# Patient Record
Sex: MALE | Race: BLACK OR AFRICAN AMERICAN | NOT HISPANIC OR LATINO | Employment: FULL TIME | URBAN - METROPOLITAN AREA
[De-identification: names, ages, dates, MRNs, and addresses within clinical notes are randomized per-mention and may not be internally consistent; named-entity substitution may affect disease eponyms.]

---

## 2017-08-24 ENCOUNTER — OFFICE VISIT (OUTPATIENT)
Dept: RETAIL CLINIC | Facility: CLINIC | Age: 42
End: 2017-08-24

## 2017-08-24 DIAGNOSIS — Z02.1 PRE-EMPLOYMENT DRUG SCREENING: Primary | ICD-10-CM

## 2022-05-03 ENCOUNTER — HOSPITAL ENCOUNTER (EMERGENCY)
Facility: HOSPITAL | Age: 47
Discharge: HOME | End: 2022-05-03
Attending: EMERGENCY MEDICINE | Admitting: EMERGENCY MEDICINE

## 2022-05-03 ENCOUNTER — APPOINTMENT (OUTPATIENT)
Dept: GENERAL RADIOLOGY | Facility: HOSPITAL | Age: 47
End: 2022-05-03

## 2022-05-03 VITALS
WEIGHT: 239 LBS | RESPIRATION RATE: 17 BRPM | HEIGHT: 70 IN | DIASTOLIC BLOOD PRESSURE: 113 MMHG | OXYGEN SATURATION: 96 % | TEMPERATURE: 98.6 F | SYSTOLIC BLOOD PRESSURE: 149 MMHG | HEART RATE: 67 BPM | BODY MASS INDEX: 34.22 KG/M2

## 2022-05-03 DIAGNOSIS — M10.472 ACUTE GOUT DUE TO OTHER SECONDARY CAUSE INVOLVING TOE OF LEFT FOOT: Primary | ICD-10-CM

## 2022-05-03 PROCEDURE — 73660 X-RAY EXAM OF TOE(S): CPT

## 2022-05-03 PROCEDURE — 25010000002 HYDROMORPHONE PER 4 MG: Performed by: EMERGENCY MEDICINE

## 2022-05-03 PROCEDURE — 96372 THER/PROPH/DIAG INJ SC/IM: CPT

## 2022-05-03 PROCEDURE — 99283 EMERGENCY DEPT VISIT LOW MDM: CPT

## 2022-05-03 RX ORDER — HYDROCHLOROTHIAZIDE 12.5 MG/1
12.5 CAPSULE, GELATIN COATED ORAL DAILY
COMMUNITY

## 2022-05-03 RX ORDER — IBUPROFEN 800 MG/1
800 TABLET ORAL EVERY 8 HOURS PRN
Qty: 12 TABLET | Refills: 0 | Status: SHIPPED | OUTPATIENT
Start: 2022-05-03 | End: 2022-05-07

## 2022-05-03 RX ORDER — METOPROLOL SUCCINATE 100 MG/1
100 TABLET, EXTENDED RELEASE ORAL DAILY
COMMUNITY

## 2022-05-03 RX ORDER — HYDROMORPHONE HYDROCHLORIDE 1 MG/ML
0.5 INJECTION, SOLUTION INTRAMUSCULAR; INTRAVENOUS; SUBCUTANEOUS ONCE
Status: COMPLETED | OUTPATIENT
Start: 2022-05-03 | End: 2022-05-03

## 2022-05-03 RX ORDER — PREDNISONE 50 MG/1
50 TABLET ORAL DAILY
Qty: 5 TABLET | Refills: 0 | Status: SHIPPED | OUTPATIENT
Start: 2022-05-03

## 2022-05-03 RX ORDER — HYDROCODONE BITARTRATE AND ACETAMINOPHEN 5; 325 MG/1; MG/1
1 TABLET ORAL EVERY 6 HOURS PRN
Qty: 7 TABLET | Refills: 0 | Status: SHIPPED | OUTPATIENT
Start: 2022-05-03

## 2022-05-03 RX ORDER — AMLODIPINE BESYLATE AND BENAZEPRIL HYDROCHLORIDE 10; 20 MG/1; MG/1
CAPSULE ORAL DAILY
COMMUNITY

## 2022-05-03 RX ADMIN — HYDROMORPHONE HYDROCHLORIDE 0.5 MG: 1 INJECTION, SOLUTION INTRAMUSCULAR; INTRAVENOUS; SUBCUTANEOUS at 06:39

## 2022-05-03 NOTE — ED PROVIDER NOTES
Subjective   This patient is a very pleasant 46-year-old -American male who comes in with complaint of gout.  He tells me he waited at a nearby hospital for approximately 7 or 8 hours in the lobby and decided to come here after calling and finding out that there was no wait time in the lobby here.  He tells me he has a history of gout.  Tells me that over the last couple of days, his left great toe started to flareup.  He tells me he has had gout in this toe before.  He tells me it is red, swollen and tender.  He denies any nausea vomiting or diarrhea.  Denies any fever chills or cough.  Denies any headache or vision changes.  Denies any fevers or chills.  He tells me he has had gout flareups in the past.  His younger brother has gout as well.  No other family member has gout.  In the past, he has taken allopurinol and tells me he went several years without any flareups.  Evidently, colchicine did not work for him and actually made his symptoms worse.  He is unsure of whether or not he has used steroids or NSAIDs in the past.  Patient comes in for pain relief and is pleasant, articulate, oriented x4.    Past medical history  Hypertension, controlled with medication, gout.  Patient denies any history of CAD, CVA, or hyperlipidemia.    Family history  Gout, brother          Review of Systems   Constitutional: Negative.  Negative for chills, fatigue, fever and unexpected weight change.   HENT: Negative for dental problem, ear pain, hearing loss and sinus pressure.    Eyes: Negative for pain and visual disturbance.   Respiratory: Negative for chest tightness and shortness of breath.    Cardiovascular: Negative for chest pain, palpitations and leg swelling.   Gastrointestinal: Negative for blood in stool, diarrhea, nausea and vomiting.   Genitourinary: Negative for difficulty urinating, dysuria, frequency, hematuria and urgency.   Musculoskeletal: Positive for arthralgias and joint swelling. Negative for myalgias,  neck pain and neck stiffness.        Left great toe   Neurological: Negative for seizures, syncope, speech difficulty, light-headedness and headaches.   Psychiatric/Behavioral: Negative for confusion.   All other systems reviewed and are negative.      Past Medical History:   Diagnosis Date   • Acute gout involving toe of left foot        Allergies   Allergen Reactions   • Lisinopril Anaphylaxis       History reviewed. No pertinent surgical history.    History reviewed. No pertinent family history.    Social History     Socioeconomic History   • Marital status:    Tobacco Use   • Smoking status: Current Every Day Smoker   Substance and Sexual Activity   • Drug use: Never   • Sexual activity: Not Currently           Objective   Physical Exam  Constitutional:       Appearance: Normal appearance. He is normal weight.   HENT:      Head: Normocephalic and atraumatic.      Right Ear: External ear normal.      Left Ear: External ear normal.      Nose: Nose normal.      Mouth/Throat:      Mouth: Mucous membranes are moist.      Pharynx: Oropharynx is clear.   Eyes:      General:         Right eye: No discharge.         Left eye: No discharge.      Extraocular Movements: Extraocular movements intact.      Conjunctiva/sclera: Conjunctivae normal.      Pupils: Pupils are equal, round, and reactive to light.   Cardiovascular:      Rate and Rhythm: Normal rate and regular rhythm.      Pulses: Normal pulses.      Heart sounds: Normal heart sounds. No murmur heard.    No gallop.   Pulmonary:      Effort: Pulmonary effort is normal.      Breath sounds: Normal breath sounds.   Abdominal:      General: Abdomen is flat. Bowel sounds are normal. There is no distension.      Palpations: Abdomen is soft. There is no mass.      Tenderness: There is no abdominal tenderness. There is no guarding.      Hernia: No hernia is present.   Musculoskeletal:         General: Swelling and tenderness present. Normal range of motion.       Cervical back: Normal range of motion.      Comments: Left great toe is warm, red, and with decreased mobility with flexion and extension secondary to pain.  No streaking erythema, or cellulitis.  No involvement of the foot or ankle joints.   Skin:     General: Skin is warm.      Capillary Refill: Capillary refill takes less than 2 seconds.      Coloration: Skin is not jaundiced.      Findings: Erythema present. No bruising.      Comments: Mild erythema around the left great toe only.  No streaking cellulitis.  No abscess.  No involvement elsewhere other than isolated to the left toe.   Neurological:      General: No focal deficit present.      Mental Status: He is alert and oriented to person, place, and time.         Procedures           ED Course  ED Course as of 05/03/22 1601   Tue May 03, 2022   0636 I had a nice conversation with the patient.  We talked about the differential diagnosis and medical decision making.  The patient has a longstanding history of gout with several flareups.  He presents here with an isolated left great toe monoarticular complaint with warmth, erythema and mild joint swelling within the left great toe.  He has no systemic symptoms.  No fever, nausea, vomiting, or subjective feeling of illness.  My plan will be to get his pain under control and I have ordered Dilaudid IM.  His wife is going to drive him home.  I will get an x-ray more or less for baseline evaluation of the toe.  I plan to send the patient home on steroids and NSAIDs and have talked to the patient both of these.  I have also talked about colchicine and allopurinol and have encouraged patient to follow-up with his PCP, Dr. De León, who he sees for blood pressure control and outpatient gout management.  Patient is very appreciative for care and without question or complaint.  Patient has no history of renal function abnormality, hepatotoxicity or gastropathy.  Patient has no history of liver disease or myopathy.  Final  impression and plan following completion of work-up. [HALEY]   0642 Patient has had issues with colchicine in the past and tells me this actually made his symptoms worse. [HALEY]   0645 I reexamined the patient after Dilaudid has been given.  He is feeling much better.  He verbalizes a full understanding of the plan.  We talked about the medications he has been prescribed as an outpatient as well.  He is currently getting his x-ray.  I will review this prior to discharge. [HALEY]   0736 X-ray has been completed, and I have reviewed images independently.  I have also cut/pasted official read below for completeness.    IMPRESSION:  1.  Arthritic changes are noted involving the first digit.     This report was finalized on 5/3/2022 7:08 AM by Joaquin Ulloa MD. [HALEY]   0736 Patient is resting comfortably currently [HALEY]   0736 .  Blood pressure has come down to 149 systolically.  Heart rate 64 with oxygen saturations of 98% on room air.  Patient will be discharged home with impression that includes acute gout.  Plan to include high-dose Motrin, steroids, follow-up with his PCP, return immediately for new or changing concerns.  Patient is agreeable to the plan without question or complaint and will be discharged home accordingly [HALEY]      ED Course User Index  [HALEY] Charles Olea MD     No results found for this or any previous visit (from the past 24 hour(s)).  Note: In addition to lab results from this visit, the labs listed above may include labs taken at another facility or during a different encounter within the last 24 hours. Please correlate lab times with ED admission and discharge times for further clarification of the services performed during this visit.    XR Toe 2+ View Left   Final Result   1.  Arthritic changes are noted involving the first digit.       This report was finalized on 5/3/2022 7:08 AM by Joaquin Ulloa MD.            Vitals:    05/03/22 0615 05/03/22 0616 05/03/22 0645 05/03/22 0730   BP: (!) 150/104   (!) 161/115 (!) 149/113   BP Location:   Right arm Right arm   Patient Position:   Lying Lying   Pulse:   69 67   Resp:   17 17   Temp:  98.6 °F (37 °C)     TempSrc:  Oral     SpO2:  98% 98% 96%   Weight:       Height:         Medications   HYDROmorphone (DILAUDID) injection 0.5 mg (0.5 mg Intramuscular Given 5/3/22 0639)     ECG/EMG Results (last 24 hours)     ** No results found for the last 24 hours. **        No orders to display                                       [place] reviewed by Charles Olea MD             [facility]    Final diagnoses:   Acute gout due to other secondary cause involving toe of left foot       ED Disposition  ED Disposition     ED Disposition   Discharge    Condition   Stable    Comment   --             Leoncio De León MD  [address] [facility]   [place] [id number] [phone]    In 1 day           Medication List      New Prescriptions    HYDROcodone-acetaminophen 5-325 MG per tablet  Commonly known as: NORCO  Take 1 tablet by mouth Every 6 (Six) Hours As Needed for Moderate Pain .     ibuprofen 800 MG tablet  Commonly known as: ADVIL,MOTRIN  Take 1 tablet by mouth Every 8 (Eight) Hours As Needed for Mild Pain  or Moderate Pain  (With food) for up to 4 days.     predniSONE 50 MG tablet  Commonly known as: DELTASONE  Take 1 tablet by mouth Daily.           Where to Get Your Medications      These medications were sent to [facility] [address] [place] [address] - [address] [facility] - [phone]  - [phone]   [address] [place] [id number]    Phone: [phone]   · HYDROcodone-acetaminophen 5-325 MG per tablet  · ibuprofen 800 MG tablet  · predniSONE 50 MG tablet          Charles Olea MD  05/03/22 4702

## 2022-05-03 NOTE — DISCHARGE INSTRUCTIONS
Take medications as prescribed.    Return to the emergency department immediately for new or changing concerns including worsening pain, redness, decreased mobility, fever or new concerns.    Follow-up with your PCP today or tomorrow as we discussed in detail.  Discussed colchicine and allopurinol therapy.    Please review the medications you are supposed to be taking according to prior physician recommendations. I have not changed your home medications during this visit. If your discharge instructions indicate that I have changed your home medications, this is not the case, and you should disregard. If you have any questions about the medication you should be taking at home, please call your physician.

## 2023-07-31 ENCOUNTER — HOSPITAL ENCOUNTER (OUTPATIENT)
Facility: HOSPITAL | Age: 48
Setting detail: OBSERVATION
Discharge: HOME | End: 2023-08-02
Attending: EMERGENCY MEDICINE | Admitting: INTERNAL MEDICINE
Payer: COMMERCIAL

## 2023-07-31 ENCOUNTER — APPOINTMENT (OUTPATIENT)
Dept: CT IMAGING | Facility: HOSPITAL | Age: 48
End: 2023-07-31
Payer: COMMERCIAL

## 2023-07-31 DIAGNOSIS — Z86.79 HISTORY OF HYPERTENSION: ICD-10-CM

## 2023-07-31 DIAGNOSIS — J35.1 PALATINE TONSIL HYPERTROPHY: ICD-10-CM

## 2023-07-31 DIAGNOSIS — R59.0 CERVICAL LYMPHADENOPATHY: ICD-10-CM

## 2023-07-31 DIAGNOSIS — K11.21 ACUTE BACTERIAL SIALADENITIS: Primary | ICD-10-CM

## 2023-07-31 DIAGNOSIS — L04.9 ACUTE ADENITIS: ICD-10-CM

## 2023-07-31 DIAGNOSIS — Z72.0 TOBACCO ABUSE: ICD-10-CM

## 2023-07-31 DIAGNOSIS — B96.89 ACUTE BACTERIAL SIALADENITIS: Primary | ICD-10-CM

## 2023-07-31 LAB
ALBUMIN SERPL-MCNC: 5 G/DL (ref 3.5–5.2)
ALBUMIN/GLOB SERPL: 1.6 G/DL
ALP SERPL-CCNC: 43 U/L (ref 39–117)
ALT SERPL W P-5'-P-CCNC: 21 U/L (ref 1–41)
ANION GAP SERPL CALCULATED.3IONS-SCNC: 13 MMOL/L (ref 5–15)
AST SERPL-CCNC: 25 U/L (ref 1–40)
B PARAPERT DNA SPEC QL NAA+PROBE: NOT DETECTED
B PERT DNA SPEC QL NAA+PROBE: NOT DETECTED
BASOPHILS # BLD AUTO: 0.02 10*3/MM3 (ref 0–0.2)
BASOPHILS NFR BLD AUTO: 0.3 % (ref 0–1.5)
BILIRUB SERPL-MCNC: 0.6 MG/DL (ref 0–1.2)
BUN SERPL-MCNC: 18 MG/DL (ref 6–20)
BUN/CREAT SERPL: 14.9 (ref 7–25)
C PNEUM DNA NPH QL NAA+NON-PROBE: NOT DETECTED
CALCIUM SPEC-SCNC: 9.4 MG/DL (ref 8.6–10.5)
CHLORIDE SERPL-SCNC: 98 MMOL/L (ref 98–107)
CO2 SERPL-SCNC: 27 MMOL/L (ref 22–29)
CREAT SERPL-MCNC: 1.21 MG/DL (ref 0.76–1.27)
DEPRECATED RDW RBC AUTO: 39.7 FL (ref 37–54)
EGFRCR SERPLBLD CKD-EPI 2021: 74.8 ML/MIN/1.73
EOSINOPHIL # BLD AUTO: 0.08 10*3/MM3 (ref 0–0.4)
EOSINOPHIL NFR BLD AUTO: 1.2 % (ref 0.3–6.2)
ERYTHROCYTE [DISTWIDTH] IN BLOOD BY AUTOMATED COUNT: 12.7 % (ref 12.3–15.4)
FLUAV SUBTYP SPEC NAA+PROBE: NOT DETECTED
FLUBV RNA ISLT QL NAA+PROBE: NOT DETECTED
GLOBULIN UR ELPH-MCNC: 3.1 GM/DL
GLUCOSE SERPL-MCNC: 129 MG/DL (ref 65–99)
HADV DNA SPEC NAA+PROBE: NOT DETECTED
HCOV 229E RNA SPEC QL NAA+PROBE: NOT DETECTED
HCOV HKU1 RNA SPEC QL NAA+PROBE: NOT DETECTED
HCOV NL63 RNA SPEC QL NAA+PROBE: NOT DETECTED
HCOV OC43 RNA SPEC QL NAA+PROBE: NOT DETECTED
HCT VFR BLD AUTO: 44.6 % (ref 37.5–51)
HGB BLD-MCNC: 15.2 G/DL (ref 13–17.7)
HMPV RNA NPH QL NAA+NON-PROBE: NOT DETECTED
HPIV1 RNA ISLT QL NAA+PROBE: NOT DETECTED
HPIV2 RNA SPEC QL NAA+PROBE: NOT DETECTED
HPIV3 RNA NPH QL NAA+PROBE: NOT DETECTED
HPIV4 P GENE NPH QL NAA+PROBE: NOT DETECTED
IMM GRANULOCYTES # BLD AUTO: 0.01 10*3/MM3 (ref 0–0.05)
IMM GRANULOCYTES NFR BLD AUTO: 0.2 % (ref 0–0.5)
LYMPHOCYTES # BLD AUTO: 2.97 10*3/MM3 (ref 0.7–3.1)
LYMPHOCYTES NFR BLD AUTO: 46.3 % (ref 19.6–45.3)
M PNEUMO IGG SER IA-ACNC: NOT DETECTED
MCH RBC QN AUTO: 29.2 PG (ref 26.6–33)
MCHC RBC AUTO-ENTMCNC: 34.1 G/DL (ref 31.5–35.7)
MCV RBC AUTO: 85.8 FL (ref 79–97)
MONOCYTES # BLD AUTO: 0.56 10*3/MM3 (ref 0.1–0.9)
MONOCYTES NFR BLD AUTO: 8.7 % (ref 5–12)
NEUTROPHILS NFR BLD AUTO: 2.78 10*3/MM3 (ref 1.7–7)
NEUTROPHILS NFR BLD AUTO: 43.3 % (ref 42.7–76)
NRBC BLD AUTO-RTO: 0 /100 WBC (ref 0–0.2)
PLATELET # BLD AUTO: 219 10*3/MM3 (ref 140–450)
PMV BLD AUTO: 11.2 FL (ref 6–12)
POTASSIUM SERPL-SCNC: 3.8 MMOL/L (ref 3.5–5.2)
PROT SERPL-MCNC: 8.1 G/DL (ref 6–8.5)
RBC # BLD AUTO: 5.2 10*6/MM3 (ref 4.14–5.8)
RHINOVIRUS RNA SPEC NAA+PROBE: NOT DETECTED
RSV RNA NPH QL NAA+NON-PROBE: NOT DETECTED
S PYO AG THROAT QL: NEGATIVE
SARS-COV-2 RNA NPH QL NAA+NON-PROBE: NOT DETECTED
SODIUM SERPL-SCNC: 138 MMOL/L (ref 136–145)
WBC NRBC COR # BLD: 6.42 10*3/MM3 (ref 3.4–10.8)

## 2023-07-31 PROCEDURE — 87081 CULTURE SCREEN ONLY: CPT | Performed by: PHYSICIAN ASSISTANT

## 2023-07-31 PROCEDURE — 85025 COMPLETE CBC W/AUTO DIFF WBC: CPT | Performed by: PHYSICIAN ASSISTANT

## 2023-07-31 PROCEDURE — 99285 EMERGENCY DEPT VISIT HI MDM: CPT

## 2023-07-31 PROCEDURE — 87880 STREP A ASSAY W/OPTIC: CPT | Performed by: PHYSICIAN ASSISTANT

## 2023-07-31 PROCEDURE — 25010000002 DEXAMETHASONE SODIUM PHOSPHATE 100 MG/10ML SOLUTION: Performed by: PHYSICIAN ASSISTANT

## 2023-07-31 PROCEDURE — 70491 CT SOFT TISSUE NECK W/DYE: CPT

## 2023-07-31 PROCEDURE — 25510000001 IOPAMIDOL 61 % SOLUTION: Performed by: EMERGENCY MEDICINE

## 2023-07-31 PROCEDURE — 96365 THER/PROPH/DIAG IV INF INIT: CPT

## 2023-07-31 PROCEDURE — 80053 COMPREHEN METABOLIC PANEL: CPT | Performed by: PHYSICIAN ASSISTANT

## 2023-07-31 PROCEDURE — 87147 CULTURE TYPE IMMUNOLOGIC: CPT | Performed by: PHYSICIAN ASSISTANT

## 2023-07-31 PROCEDURE — 0202U NFCT DS 22 TRGT SARS-COV-2: CPT | Performed by: PHYSICIAN ASSISTANT

## 2023-07-31 RX ORDER — CLINDAMYCIN PHOSPHATE 600 MG/50ML
600 INJECTION INTRAVENOUS ONCE
Status: COMPLETED | OUTPATIENT
Start: 2023-07-31 | End: 2023-08-01

## 2023-07-31 RX ORDER — SODIUM CHLORIDE 0.9 % (FLUSH) 0.9 %
10 SYRINGE (ML) INJECTION AS NEEDED
Status: DISCONTINUED | OUTPATIENT
Start: 2023-07-31 | End: 2023-08-02 | Stop reason: HOSPADM

## 2023-07-31 RX ORDER — CLINDAMYCIN PHOSPHATE 300 MG/50ML
300 INJECTION INTRAVENOUS ONCE
Status: COMPLETED | OUTPATIENT
Start: 2023-07-31 | End: 2023-08-01

## 2023-07-31 RX ADMIN — IOPAMIDOL 75 ML: 612 INJECTION, SOLUTION INTRAVENOUS at 21:40

## 2023-07-31 RX ADMIN — DEXAMETHASONE SODIUM PHOSPHATE 10 MG: 10 INJECTION, SOLUTION INTRAMUSCULAR; INTRAVENOUS at 23:58

## 2023-07-31 NOTE — Clinical Note
Level of Care: Telemetry [5]   Diagnosis: Sialadenitis [205217]   Admitting Physician: FRANCES DICKENS III [[id number]]   Attending Physician: FRANCES DICKENS III [[id number]]   Bed Request Comments: tele obs

## 2023-07-31 NOTE — LETTER
To Whom it May Concern:      Mr. Sanchez was under the care of [facility] from Monday 7/31/2023 through Wednesday 8/2/2023.     Thank you         Martina Melendez Rn

## 2023-07-31 NOTE — ED PROVIDER NOTES
EMERGENCY DEPARTMENT ENCOUNTER    Pt Name: Bandar Sanchez  MRN: [id number]  Pt :   1975  Room Number:  S226/1  Date of encounter:  2023  PCP: Leoncio De León MD  ED Provider: DAYA Sims    Historian: Patient    HPI:  Chief Complaint: Neck pain and swelling    Context: Bandar Sanchez is a 47 y.o. male who presents to the ED c/o pain and swelling of the right side of his neck.  Patient states that he started to experience symptoms last night.  He states that when he laid down to go to sleep he felt as if the swelling was pressing on his airway causing him to have more difficulty with breathing.  Patient sleeps with a CPAP at night.  He has past medical history significant for hypertension.  He noticed that the swelling has gotten worse through the day today and that he is having difficulty swallowing.  He current denies any difficulty with breathing.  He reports no fever.  He denies any recent symptoms of cough, runny nose or nasal congestion.  He reports no additional associated symptoms on exam.  HPI     REVIEW OF SYSTEMS  A chief complaint appropriate review of systems was completed and is negative except as noted in the HPI.     PAST MEDICAL HISTORY  Past Medical History:   Diagnosis Date    Acute gout involving toe of left foot        PAST SURGICAL HISTORY  History reviewed. No pertinent surgical history.    FAMILY HISTORY  History reviewed. No pertinent family history.    SOCIAL HISTORY  Social History     Socioeconomic History    Marital status:    Tobacco Use    Smoking status: Some Days     Types: Cigars   Vaping Use    Vaping Use: Some days    Substances: Nicotine    Devices: Disposable   Substance and Sexual Activity    Drug use: Never    Sexual activity: Defer       ALLERGIES  Lisinopril    PHYSICAL EXAM  Physical Exam  Vitals and nursing note reviewed.   Constitutional:       General: He is not in acute distress.     Appearance: Normal appearance. He is ill-appearing.    HENT:      Head: Normocephalic and atraumatic.      Jaw: Tenderness present.      Salivary Glands: Right salivary gland is diffusely enlarged and tender.        Nose: Nose normal.      Mouth/Throat:      Mouth: Mucous membranes are moist.      Tonsils: No tonsillar exudate or tonsillar abscesses. 3+ on the right. 3+ on the left.   Eyes:      Extraocular Movements: Extraocular movements intact.   Cardiovascular:      Rate and Rhythm: Normal rate.   Pulmonary:      Effort: Pulmonary effort is normal.   Abdominal:      General: There is no distension.   Musculoskeletal:         General: Normal range of motion.      Cervical back: Normal range of motion and neck supple.   Skin:     General: Skin is warm and dry.   Neurological:      General: No focal deficit present.      Mental Status: He is alert.   Psychiatric:         Mood and Affect: Mood normal.         Behavior: Behavior normal.       LAB RESULTS  Results for orders placed or performed during the hospital encounter of 07/31/23   Rapid Strep A Screen - Swab, Throat    Specimen: Throat; Swab   Result Value Ref Range    Strep A Ag Negative Negative   Respiratory Panel PCR w/COVID-19(SARS-CoV-2) JENNIFER/CARLOS/CARLEE/PAD/COR/MAD/NADJA In-House, NP Swab in UTM/VTM, 3-4 HR TAT - Swab, Nasopharynx    Specimen: Nasopharynx; Swab   Result Value Ref Range    ADENOVIRUS, PCR Not Detected Not Detected    Coronavirus 229E Not Detected Not Detected    Coronavirus HKU1 Not Detected Not Detected    Coronavirus NL63 Not Detected Not Detected    Coronavirus OC43 Not Detected Not Detected    COVID19 Not Detected Not Detected - Ref. Range    Human Metapneumovirus Not Detected Not Detected    Human Rhinovirus/Enterovirus Not Detected Not Detected    Influenza A PCR Not Detected Not Detected    Influenza B PCR Not Detected Not Detected    Parainfluenza Virus 1 Not Detected Not Detected    Parainfluenza Virus 2 Not Detected Not Detected    Parainfluenza Virus 3 Not Detected Not Detected     Parainfluenza Virus 4 Not Detected Not Detected    RSV, PCR Not Detected Not Detected    Bordetella pertussis pcr Not Detected Not Detected    Bordetella parapertussis PCR Not Detected Not Detected    Chlamydophila pneumoniae PCR Not Detected Not Detected    Mycoplasma pneumo by PCR Not Detected Not Detected   Comprehensive Metabolic Panel    Specimen: Blood   Result Value Ref Range    Glucose 129 (H) 65 - 99 mg/dL    BUN 18 6 - 20 mg/dL    Creatinine 1.21 0.76 - 1.27 mg/dL    Sodium 138 136 - 145 mmol/L    Potassium 3.8 3.5 - 5.2 mmol/L    Chloride 98 98 - 107 mmol/L    CO2 27.0 22.0 - 29.0 mmol/L    Calcium 9.4 8.6 - 10.5 mg/dL    Total Protein 8.1 6.0 - 8.5 g/dL    Albumin 5.0 3.5 - 5.2 g/dL    ALT (SGPT) 21 1 - 41 U/L    AST (SGOT) 25 1 - 40 U/L    Alkaline Phosphatase 43 39 - 117 U/L    Total Bilirubin 0.6 0.0 - 1.2 mg/dL    Globulin 3.1 gm/dL    A/G Ratio 1.6 g/dL    BUN/Creatinine Ratio 14.9 7.0 - 25.0    Anion Gap 13.0 5.0 - 15.0 mmol/L    eGFR 74.8 >60.0 mL/min/1.73   CBC Auto Differential    Specimen: Blood   Result Value Ref Range    WBC 6.42 3.40 - 10.80 10*3/mm3    RBC 5.20 4.14 - 5.80 10*6/mm3    Hemoglobin 15.2 13.0 - 17.7 g/dL    Hematocrit 44.6 37.5 - 51.0 %    MCV 85.8 79.0 - 97.0 fL    MCH 29.2 26.6 - 33.0 pg    MCHC 34.1 31.5 - 35.7 g/dL    RDW 12.7 12.3 - 15.4 %    RDW-SD 39.7 37.0 - 54.0 fl    MPV 11.2 6.0 - 12.0 fL    Platelets 219 140 - 450 10*3/mm3    Neutrophil % 43.3 42.7 - 76.0 %    Lymphocyte % 46.3 (H) 19.6 - 45.3 %    Monocyte % 8.7 5.0 - 12.0 %    Eosinophil % 1.2 0.3 - 6.2 %    Basophil % 0.3 0.0 - 1.5 %    Immature Grans % 0.2 0.0 - 0.5 %    Neutrophils, Absolute 2.78 1.70 - 7.00 10*3/mm3    Lymphocytes, Absolute 2.97 0.70 - 3.10 10*3/mm3    Monocytes, Absolute 0.56 0.10 - 0.90 10*3/mm3    Eosinophils, Absolute 0.08 0.00 - 0.40 10*3/mm3    Basophils, Absolute 0.02 0.00 - 0.20 10*3/mm3    Immature Grans, Absolute 0.01 0.00 - 0.05 10*3/mm3    nRBC 0.0 0.0 - 0.2 /100 WBC       If labs  were ordered, I independently reviewed the results and considered them in treating the patient.    RADIOLOGY  CT Soft Tissue Neck With Contrast   Final Result   Impression:   1.Edematous and enlarged right submandibular gland with surrounding fat stranding. This finding suggest acute sialadenitis.   2.Severe palatine tonsillitis/adenitis causing severe airway narrowing.   3.Extensive soft tissue prominence at the base of tongue may represent enlarged and edematous lingual tonsil/tonsillitis. However, follow-up and direct visualization recommended to exclude base of tongue mass.   4.Neck lymphadenopathy, worse on the right.         Case discussed with clinician ROBBIE HOPE @ 7/31/2023 10:39 PM EDT.         Electronically Signed: Corby Blanco     7/31/2023 10:40 PM EDT     Workstation ID: [id number]        [x] Radiologist's Report Reviewed:  I ordered and independently reviewed the above noted radiographic studies.  See radiologist's dictation for official interpretation.      PROCEDURES    Procedures    No orders to display       MEDICATIONS GIVEN IN ER    Medications   sodium chloride 0.9 % flush 10 mL (has no administration in time range)   sodium chloride 0.9 % infusion (100 mL/hr Intravenous New Bag 8/1/23 0358)   dexamethasone (DECADRON) injection 4 mg (has no administration in time range)   niCARdipine (CARDENE) 25mg in 250mL NS infusion (0 mg/hr Intravenous Hold 8/1/23 0358)   sodium chloride 0.9 % flush 10 mL (has no administration in time range)   sodium chloride 0.9 % flush 10 mL (has no administration in time range)   sodium chloride 0.9 % infusion 40 mL (has no administration in time range)   clindamycin (CLEOCIN) 600 mg in dextrose 5% 50 mL IVPB (premix) (has no administration in time range)   iopamidol (ISOVUE-300) 61 % injection 75 mL (75 mL Intravenous Given 7/31/23 2140)   dexamethasone (DECADRON) IVPB 10 mg (0 mg Intravenous Stopped 8/1/23 0020)   clindamycin (CLEOCIN) IVPB 300 mg (0 mg Intravenous  Stopped 8/1/23 0222)     Followed by   clindamycin (CLEOCIN) 600 mg in dextrose 5% 50 mL IVPB (premix) (0 mg Intravenous Stopped 8/1/23 0306)   ketorolac (TORADOL) injection 30 mg (30 mg Intravenous Given 8/1/23 0308)       MEDICAL DECISION MAKING, PROGRESS, and CONSULTS   Medical Decision Making  Problems Addressed:  Acute adenitis: complicated acute illness or injury  Acute bacterial sialadenitis: complicated acute illness or injury  Cervical lymphadenopathy: complicated acute illness or injury  History of hypertension: complicated acute illness or injury  [place] tonsil hypertrophy: complicated acute illness or injury  Tobacco abuse: complicated acute illness or injury    Amount and/or Complexity of Data Reviewed  Labs: ordered.  Radiology: ordered.    Risk  Prescription drug management.  Decision regarding hospitalization.        All labs have been independently reviewed by me.  All radiology studies have been reviewed by me and the radiologist dictating the report.  All EKG's have been independently viewed by me.    [x] Discussed with radiology regarding test interpretation: Imaging reviewed with radiologist who was concerned based on patient's enlarged tonsils that there is airway compromise.    Discussion below represents my analysis of pertinent findings related to patient's condition, differential diagnosis, treatment plan and final disposition.    Differential diagnosis:  The differential diagnosis associated with the patient's presentation includes: Viral pharyngitis, allergies, strep pharyngitis, infectious mononucleosis, influenza, thrush, uvulitis, smoking, dry air, esophageal reflux, peritonsillar abscess, angioedema, retropharyngeal space infection, epiglottitis and trauma.     Additional sources  Discussed/ obtained information from independent historians:   [x] Spouse  [] Parent  [] Family member  [] Friend  [] EMS   [] Other:  External (non-ED) record review:   [] Inpatient record:   [] Office  record:   [] Outpatient record:   [] Prior Outpatient labs:   [] Prior Outpatient radiology:   [] Primary Care record:   [] Outside ED record:   [] Other:   Patient's care impacted by:   [] Diabetes  [x] Hypertension  [] Hyperlipidemia  [] Hypothyroidism   [] Coronary Artery Disease   [] COPD   [] Cancer   [] Obesity  [] GERD   [] Tobacco Abuse   [] Substance Abuse    [] Anxiety   [] Depression   [x] Other: Obstructive sleep apnea on CPAP  Care significantly affected by Social Determinants of Health (housing and economic circumstances, unemployment)    [] Yes     [x] No   If yes, Patient's care significantly limited by  Social Determinants of Health including:   [] Inadequate housing   [] Low income   [] Alcoholism and drug addiction in family   [] Problems related to primary support group   [] Unemployment   [] Problems related to employment   [] Other Social Determinants of Health:     Shared decision making:  I reviewed workup performed in ED including labs and imaging. Based on findings, recommendation made for admission. Patient is agreeable to plan of care and hospital admission.      Orders placed during this visit:  Orders Placed This Encounter   Procedures    COVID PRE-OP / PRE-PROCEDURE SCREENING ORDER (NO ISOLATION) - Swab, Nasopharynx    Rapid Strep A Screen - Swab, Throat    Respiratory Panel PCR w/COVID-19(SARS-CoV-2) JENNIFER/CARLOS/CARLEE/PAD/COR/MAD/NADJA In-House, NP Swab in UTM/VTM, 3-4 HR TAT - Swab, Nasopharynx    Beta Strep Culture, Throat - Swab, Throat    CT Soft Tissue Neck With Contrast    Comprehensive Metabolic Panel    CBC Auto Differential    CBC Auto Differential    Basic Metabolic Panel    Magnesium    NPO Diet NPO Type: Strict NPO    Vital Signs    Intake & Output    Weigh Patient    Oral Care    Saline Lock & Maintain IV Access    Place Sequential Compression Device    Maintain Sequential Compression Device    Pulse Oximetry, Continuous    Up With Assistance    Follow Standard Precautions     Code Status and Medical Interventions:    Inpatient ENT Consult    Insert Peripheral IV    Insert Peripheral IV    Initiate Observation Status    ED Bed Request    CBC & Differential       ED Course:    ED Course as of 08/01/23 0443   Mon Jul 31, 2023 2242 Notified by radiology of abnormal findings on CT.  [JG]   2244 Case reviewed with Dr. Leavitt, recommended ENT consult.  [JG]   2244 ENT paged [JG]   8990 I spoke with Dr. Leon with ENT who also was in agreement of admission to hospitalist service.  Clindamycin and Decadron.  Patient can use blow-by oxygen as needed.  He is available for consult. [JG]   7208 Consulted with pharmacy will initiate clindamycin [JG]   Tue Aug 01, 2023   9894 In summary this is a 47-year-old male presents the ER for evaluation of swelling on the right side of his lower face and neck. CT imaging demonstrated Edematous and enlarged right submandibular gland with surrounding fat stranding. This finding suggest acute sialadenitis, severe palatine tonsillitis/adenitis causing severe airway narrowing and extensive soft tissue prominence at the base of tongue may represent enlarged and edematous lingual tonsil/tonsillitis with neck lymphadenopathy, worse on the right. Labs/urinalysis obtained and reviewed demonstrate no acute or emergent abnormalities.  Consult placed to ENT with recommendations implemented as documented.  Hospitalist consulted for admission agreeable to accept patient.  Patient agreeable to plan of care hospital admission.  Antibiotics and steroids initiated in ED. [JG]      ED Course User Index  [JG] Daniel Terrell PA            DIAGNOSIS  Final diagnoses:   Acute bacterial sialadenitis   [place] tonsil hypertrophy   Acute adenitis   Cervical lymphadenopathy   History of hypertension   Tobacco abuse       DISPOSITION    ED Disposition       ED Disposition   Decision to Admit    Condition   --    Comment   Level of Care: Telemetry [5]   Diagnosis: Sialadenitis  [[id number]]   Admitting Physician: FRANCES DICKENS III [[id number]]   Attending Physician: FRANCES DICKENS III [[id number]]   Bed Request Comments: tele obs                 Please note that portions of this document were completed with voice recognition software.        Daniel Terrell PA  08/01/23 0443

## 2023-07-31 NOTE — LETTER
August 2, 2023     Patient: Bandar Sanchez   YOB: 1975   Date of Visit: 7/31/2023       To Whom It May Concern:    Mr. Sanchez was under the care of [facility] from Monday 7/31/2023 through Wednesday 8/2/2023.            Sincerely,        Maye Atwood RN

## 2023-08-01 PROBLEM — I10 BENIGN ESSENTIAL HTN: Status: ACTIVE | Noted: 2023-08-01

## 2023-08-01 PROBLEM — J03.90 TONSILLITIS: Status: ACTIVE | Noted: 2023-08-01

## 2023-08-01 PROBLEM — G47.33 OSA ON CPAP: Status: ACTIVE | Noted: 2023-08-01

## 2023-08-01 PROBLEM — K11.20 SIALADENITIS: Status: ACTIVE | Noted: 2023-08-01

## 2023-08-01 PROBLEM — Z99.89 OSA ON CPAP: Status: ACTIVE | Noted: 2023-08-01

## 2023-08-01 LAB
ANION GAP SERPL CALCULATED.3IONS-SCNC: 10 MMOL/L (ref 5–15)
BASOPHILS # BLD AUTO: 0 10*3/MM3 (ref 0–0.2)
BASOPHILS NFR BLD AUTO: 0 % (ref 0–1.5)
BUN SERPL-MCNC: 15 MG/DL (ref 6–20)
BUN/CREAT SERPL: 14 (ref 7–25)
CALCIUM SPEC-SCNC: 9.5 MG/DL (ref 8.6–10.5)
CHLORIDE SERPL-SCNC: 96 MMOL/L (ref 98–107)
CO2 SERPL-SCNC: 24 MMOL/L (ref 22–29)
CREAT SERPL-MCNC: 1.07 MG/DL (ref 0.76–1.27)
DEPRECATED RDW RBC AUTO: 37.6 FL (ref 37–54)
EGFRCR SERPLBLD CKD-EPI 2021: 86.7 ML/MIN/1.73
EOSINOPHIL # BLD AUTO: 0 10*3/MM3 (ref 0–0.4)
EOSINOPHIL NFR BLD AUTO: 0 % (ref 0.3–6.2)
ERYTHROCYTE [DISTWIDTH] IN BLOOD BY AUTOMATED COUNT: 12.3 % (ref 12.3–15.4)
GLUCOSE SERPL-MCNC: 196 MG/DL (ref 65–99)
HCT VFR BLD AUTO: 44.4 % (ref 37.5–51)
HGB BLD-MCNC: 15.3 G/DL (ref 13–17.7)
IMM GRANULOCYTES # BLD AUTO: 0.02 10*3/MM3 (ref 0–0.05)
IMM GRANULOCYTES NFR BLD AUTO: 0.4 % (ref 0–0.5)
LYMPHOCYTES # BLD AUTO: 0.61 10*3/MM3 (ref 0.7–3.1)
LYMPHOCYTES NFR BLD AUTO: 13.2 % (ref 19.6–45.3)
MAGNESIUM SERPL-MCNC: 2.5 MG/DL (ref 1.6–2.6)
MCH RBC QN AUTO: 28.9 PG (ref 26.6–33)
MCHC RBC AUTO-ENTMCNC: 34.5 G/DL (ref 31.5–35.7)
MCV RBC AUTO: 83.9 FL (ref 79–97)
MONOCYTES # BLD AUTO: 0.07 10*3/MM3 (ref 0.1–0.9)
MONOCYTES NFR BLD AUTO: 1.5 % (ref 5–12)
NEUTROPHILS NFR BLD AUTO: 3.93 10*3/MM3 (ref 1.7–7)
NEUTROPHILS NFR BLD AUTO: 84.9 % (ref 42.7–76)
NRBC BLD AUTO-RTO: 0 /100 WBC (ref 0–0.2)
PLATELET # BLD AUTO: 220 10*3/MM3 (ref 140–450)
PMV BLD AUTO: 11.4 FL (ref 6–12)
POTASSIUM SERPL-SCNC: 4.4 MMOL/L (ref 3.5–5.2)
RBC # BLD AUTO: 5.29 10*6/MM3 (ref 4.14–5.8)
SODIUM SERPL-SCNC: 130 MMOL/L (ref 136–145)
WBC NRBC COR # BLD: 4.63 10*3/MM3 (ref 3.4–10.8)

## 2023-08-01 PROCEDURE — 96376 TX/PRO/DX INJ SAME DRUG ADON: CPT

## 2023-08-01 PROCEDURE — 96375 TX/PRO/DX INJ NEW DRUG ADDON: CPT

## 2023-08-01 PROCEDURE — G0378 HOSPITAL OBSERVATION PER HR: HCPCS

## 2023-08-01 PROCEDURE — 25010000002 CLINDAMYCIN 600 MG/50ML SOLUTION: Performed by: NURSE PRACTITIONER

## 2023-08-01 PROCEDURE — 25010000002 AMPICILLIN-SULBACTAM PER 1.5 G: Performed by: NURSE PRACTITIONER

## 2023-08-01 PROCEDURE — 94761 N-INVAS EAR/PLS OXIMETRY MLT: CPT

## 2023-08-01 PROCEDURE — 85025 COMPLETE CBC W/AUTO DIFF WBC: CPT | Performed by: NURSE PRACTITIONER

## 2023-08-01 PROCEDURE — 96367 TX/PROPH/DG ADDL SEQ IV INF: CPT

## 2023-08-01 PROCEDURE — 83735 ASSAY OF MAGNESIUM: CPT | Performed by: NURSE PRACTITIONER

## 2023-08-01 PROCEDURE — 80048 BASIC METABOLIC PNL TOTAL CA: CPT | Performed by: NURSE PRACTITIONER

## 2023-08-01 PROCEDURE — 25010000002 CLINDAMYCIN 300 MG/50ML SOLUTION: Performed by: PHYSICIAN ASSISTANT

## 2023-08-01 PROCEDURE — 96366 THER/PROPH/DIAG IV INF ADDON: CPT

## 2023-08-01 PROCEDURE — 25010000002 KETOROLAC TROMETHAMINE PER 15 MG: Performed by: NURSE PRACTITIONER

## 2023-08-01 PROCEDURE — 25010000002 DEXAMETHASONE PER 1 MG: Performed by: OTOLARYNGOLOGY

## 2023-08-01 PROCEDURE — 25010000002 CLINDAMYCIN 600 MG/50ML SOLUTION: Performed by: PHYSICIAN ASSISTANT

## 2023-08-01 PROCEDURE — 25010000002 MORPHINE PER 10 MG: Performed by: INTERNAL MEDICINE

## 2023-08-01 PROCEDURE — 25010000002 DEXAMETHASONE PER 1 MG: Performed by: NURSE PRACTITIONER

## 2023-08-01 RX ORDER — DEXAMETHASONE SODIUM PHOSPHATE 4 MG/ML
10 INJECTION, SOLUTION INTRA-ARTICULAR; INTRALESIONAL; INTRAMUSCULAR; INTRAVENOUS; SOFT TISSUE EVERY 8 HOURS SCHEDULED
Status: DISCONTINUED | OUTPATIENT
Start: 2023-08-01 | End: 2023-08-02 | Stop reason: HOSPADM

## 2023-08-01 RX ORDER — MORPHINE SULFATE 2 MG/ML
2 INJECTION, SOLUTION INTRAMUSCULAR; INTRAVENOUS
Status: DISCONTINUED | OUTPATIENT
Start: 2023-08-01 | End: 2023-08-02 | Stop reason: HOSPADM

## 2023-08-01 RX ORDER — METOPROLOL SUCCINATE 100 MG/1
100 TABLET, EXTENDED RELEASE ORAL
Status: DISCONTINUED | OUTPATIENT
Start: 2023-08-01 | End: 2023-08-02 | Stop reason: HOSPADM

## 2023-08-01 RX ORDER — SODIUM CHLORIDE 0.9 % (FLUSH) 0.9 %
10 SYRINGE (ML) INJECTION EVERY 12 HOURS SCHEDULED
Status: DISCONTINUED | OUTPATIENT
Start: 2023-08-01 | End: 2023-08-02 | Stop reason: HOSPADM

## 2023-08-01 RX ORDER — KETOROLAC TROMETHAMINE 30 MG/ML
30 INJECTION, SOLUTION INTRAMUSCULAR; INTRAVENOUS ONCE
Status: COMPLETED | OUTPATIENT
Start: 2023-08-01 | End: 2023-08-01

## 2023-08-01 RX ORDER — SODIUM CHLORIDE 0.9 % (FLUSH) 0.9 %
10 SYRINGE (ML) INJECTION AS NEEDED
Status: DISCONTINUED | OUTPATIENT
Start: 2023-08-01 | End: 2023-08-02 | Stop reason: HOSPADM

## 2023-08-01 RX ORDER — LOSARTAN POTASSIUM 25 MG/1
25 TABLET ORAL
Status: DISCONTINUED | OUTPATIENT
Start: 2023-08-01 | End: 2023-08-02 | Stop reason: HOSPADM

## 2023-08-01 RX ORDER — AMLODIPINE BESYLATE 10 MG/1
10 TABLET ORAL
Status: DISCONTINUED | OUTPATIENT
Start: 2023-08-01 | End: 2023-08-02 | Stop reason: HOSPADM

## 2023-08-01 RX ORDER — SODIUM CHLORIDE 9 MG/ML
100 INJECTION, SOLUTION INTRAVENOUS CONTINUOUS
Status: ACTIVE | OUTPATIENT
Start: 2023-08-01 | End: 2023-08-01

## 2023-08-01 RX ORDER — DEXAMETHASONE SODIUM PHOSPHATE 4 MG/ML
4 INJECTION, SOLUTION INTRA-ARTICULAR; INTRALESIONAL; INTRAMUSCULAR; INTRAVENOUS; SOFT TISSUE EVERY 6 HOURS
Status: DISCONTINUED | OUTPATIENT
Start: 2023-08-01 | End: 2023-08-01

## 2023-08-01 RX ORDER — HYDROCHLOROTHIAZIDE 12.5 MG/1
12.5 CAPSULE, GELATIN COATED ORAL DAILY
Status: DISCONTINUED | OUTPATIENT
Start: 2023-08-01 | End: 2023-08-02 | Stop reason: HOSPADM

## 2023-08-01 RX ORDER — SODIUM CHLORIDE 9 MG/ML
40 INJECTION, SOLUTION INTRAVENOUS AS NEEDED
Status: DISCONTINUED | OUTPATIENT
Start: 2023-08-01 | End: 2023-08-02 | Stop reason: HOSPADM

## 2023-08-01 RX ORDER — CLINDAMYCIN PHOSPHATE 600 MG/50ML
600 INJECTION INTRAVENOUS EVERY 6 HOURS
Status: DISCONTINUED | OUTPATIENT
Start: 2023-08-01 | End: 2023-08-01

## 2023-08-01 RX ORDER — LOSARTAN POTASSIUM 25 MG/1
25 TABLET ORAL DAILY
COMMUNITY
End: 2023-08-02 | Stop reason: HOSPADM

## 2023-08-01 RX ADMIN — DEXAMETHASONE SODIUM PHOSPHATE 4 MG: 4 INJECTION, SOLUTION INTRAMUSCULAR; INTRAVENOUS at 06:18

## 2023-08-01 RX ADMIN — METOPROLOL SUCCINATE 100 MG: 100 TABLET, EXTENDED RELEASE ORAL at 15:52

## 2023-08-01 RX ADMIN — SODIUM CHLORIDE 100 ML/HR: 9 INJECTION, SOLUTION INTRAVENOUS at 03:58

## 2023-08-01 RX ADMIN — HYDROCODONE BITARTRATE AND ACETAMINOPHEN 10 ML: 7.5; 325 SOLUTION ORAL at 22:26

## 2023-08-01 RX ADMIN — Medication 10 ML: at 20:42

## 2023-08-01 RX ADMIN — CLINDAMYCIN PHOSPHATE 300 MG: 300 INJECTION, SOLUTION INTRAVENOUS at 00:53

## 2023-08-01 RX ADMIN — MORPHINE SULFATE 2 MG: 2 INJECTION, SOLUTION INTRAMUSCULAR; INTRAVENOUS at 20:41

## 2023-08-01 RX ADMIN — LOSARTAN POTASSIUM 25 MG: 25 TABLET, FILM COATED ORAL at 15:52

## 2023-08-01 RX ADMIN — DEXAMETHASONE SODIUM PHOSPHATE 10 MG: 4 INJECTION, SOLUTION INTRAMUSCULAR; INTRAVENOUS at 22:25

## 2023-08-01 RX ADMIN — KETOROLAC TROMETHAMINE 30 MG: 30 INJECTION, SOLUTION INTRAMUSCULAR; INTRAVENOUS at 03:08

## 2023-08-01 RX ADMIN — CLINDAMYCIN PHOSPHATE 600 MG: 600 INJECTION, SOLUTION INTRAVENOUS at 08:20

## 2023-08-01 RX ADMIN — AMLODIPINE BESYLATE 10 MG: 10 TABLET ORAL at 15:53

## 2023-08-01 RX ADMIN — MORPHINE SULFATE 2 MG: 2 INJECTION, SOLUTION INTRAMUSCULAR; INTRAVENOUS at 12:25

## 2023-08-01 RX ADMIN — MORPHINE SULFATE 2 MG: 2 INJECTION, SOLUTION INTRAMUSCULAR; INTRAVENOUS at 08:20

## 2023-08-01 RX ADMIN — DEXAMETHASONE SODIUM PHOSPHATE 10 MG: 4 INJECTION, SOLUTION INTRAMUSCULAR; INTRAVENOUS at 15:53

## 2023-08-01 RX ADMIN — SODIUM CHLORIDE 3 G: 900 INJECTION INTRAVENOUS at 20:41

## 2023-08-01 RX ADMIN — CLINDAMYCIN PHOSPHATE 600 MG: 600 INJECTION, SOLUTION INTRAVENOUS at 02:22

## 2023-08-01 RX ADMIN — DEXAMETHASONE SODIUM PHOSPHATE 4 MG: 4 INJECTION, SOLUTION INTRAMUSCULAR; INTRAVENOUS at 12:25

## 2023-08-01 RX ADMIN — HYDROCHLOROTHIAZIDE 12.5 MG: 12.5 CAPSULE ORAL at 15:53

## 2023-08-01 RX ADMIN — Medication 10 ML: at 08:20

## 2023-08-01 NOTE — H&P
[facility] Medicine Services  HISTORY AND PHYSICAL    Patient Name: Bandar Sanchez  : 1975  MRN: [id number]  Primary Care Physician: Leoncio De León MD  Date of admission: 2023    Subjective   Subjective     Chief Complaint:  Neck swelling    HPI:  Bandar Sanchez is a 47 y.o. male with PMHx of HTN, CHELSY (CPAP) who presents to the ED for evaluation of right neck swelling and discomfort. Reports symptoms began suddenly Surya evening and worsened throughout the day Monday. He felt like he had a cut on his tongue but no cut and then his right neck developed a knot under his jaw. After eating it became more firm, he tried to lay down to sleep with his CPAP mask and the area became softer but he felt like it was causing pressure in his neck and felt hard to breathe. He has been able to swallow liquids and denies any choking sensation. Initially his throat was not sore, but seems a little sore now. No fever or chills. No recent illness or known sick contacts.    CT soft tissue neck w/ contrast obtained in the ED w/ concerns for sialadenitis as well as severe palantine tonsillitis/adenitis causing airway narrowing; also, extensive soft tissue prominence at the base of the tongue, recommend direct visualization to r/o tongue mass; neck lymphadenopathy worse on the right.    Review of Systems   Constitutional:  Negative for chills and fever.   HENT:  Positive for sore throat and voice change. Negative for congestion and rhinorrhea.    Musculoskeletal:  Positive for neck pain.   All other systems reviewed and are negative.         Personal History     Past Medical History:   Diagnosis Date    Acute gout involving toe of left foot              No past surgical history on file.    Family History:  family history is not on file.     Social History:  reports that he has been smoking. He does not have any smokeless tobacco history on file. He reports that he does not use drugs.  Social  History     Social History Narrative    Not on file       Medications:  HYDROcodone-acetaminophen, amLODIPine-benazepril, hydroCHLOROthiazide, metoprolol succinate XL, and predniSONE    Allergies   Allergen Reactions    Lisinopril Anaphylaxis       Objective   Objective     Vital Signs:   Temp:  [98.9 øF (37.2 øC)] 98.9 øF (37.2 øC)  Heart Rate:  [73-85] 73  Resp:  [16] 16  BP: (121-178)/() 143/94    Physical Exam  Constitutional:       General: He is not in acute distress.     Appearance: He is well-developed.   HENT:      Head: Normocephalic and atraumatic.      Salivary Glands: Right salivary gland is diffusely enlarged and tender.      Nose: Nose normal.      Mouth/Throat:      Mouth: Mucous membranes are moist.      Pharynx: Pharyngeal swelling present.      Comments: Difficult to assess d/t pain with opening mouth - palate edema R>L    Eyes:      Extraocular Movements: Extraocular movements intact.      Conjunctiva/sclera: Conjunctivae normal.      Pupils: Pupils are equal, round, and reactive to light.   Cardiovascular:      Rate and Rhythm: Normal rate and regular rhythm.      Pulses: Normal pulses.      Heart sounds: Normal heart sounds. No murmur heard.  Pulmonary:      Effort: Pulmonary effort is normal.      Breath sounds: Normal breath sounds.   Abdominal:      General: Bowel sounds are normal. There is no distension.      Palpations: Abdomen is soft.      Tenderness: There is no abdominal tenderness.   Musculoskeletal:         General: No swelling. Normal range of motion.      Cervical back: Normal range of motion and neck supple.   Skin:     General: Skin is warm and dry.      Capillary Refill: Capillary refill takes less than 2 seconds.      Findings: No rash.   Neurological:      Mental Status: He is alert and oriented to person, place, and time.      Cranial Nerves: No cranial nerve deficit.   Psychiatric:         Mood and Affect: Mood normal.         Behavior: Behavior normal.           Result Review:  I have personally reviewed the results from the time of this admission to 8/1/2023 02:07 EDT and agree with these findings:  [x]  Laboratory list / accordion  []  Microbiology  [x]  Radiology  []  EKG/Telemetry   []  Cardiology/Vascular   []  Pathology  []  Old records  []  Other:  Most notable findings include:     LAB RESULTS:      Lab 07/31/23 2050   WBC 6.42   HEMOGLOBIN 15.2   HEMATOCRIT 44.6   PLATELETS 219   NEUTROS ABS 2.78   IMMATURE GRANS (ABS) 0.01   LYMPHS ABS 2.97   MONOS ABS 0.56   EOS ABS 0.08   MCV 85.8         Lab 07/31/23 2050   SODIUM 138   POTASSIUM 3.8   CHLORIDE 98   CO2 27.0   ANION GAP 13.0   BUN 18   CREATININE 1.21   EGFR 74.8   GLUCOSE 129*   CALCIUM 9.4         Lab 07/31/23 2050   TOTAL PROTEIN 8.1   ALBUMIN 5.0   GLOBULIN 3.1   ALT (SGPT) 21   AST (SGOT) 25   BILIRUBIN 0.6   ALK PHOS 43                     Brief Urine Lab Results       None          Microbiology Results (last 10 days)       Procedure Component Value - Date/Time    COVID PRE-OP / PRE-PROCEDURE SCREENING ORDER (NO ISOLATION) - Swab, Nasopharynx [[id number]]  (Normal) Collected: 07/31/23 2036    Lab Status: Final result Specimen: Swab from Nasopharynx Updated: 07/31/23 2134    Narrative:      The following orders were created for panel order COVID PRE-OP / PRE-PROCEDURE SCREENING ORDER (NO ISOLATION) - Swab, Nasopharynx.  Procedure                               Abnormality         Status                     ---------                               -----------         ------                     Respiratory Panel PCR w/...[[id number]]  Normal              Final result                 Please view results for these tests on the individual orders.    Rapid Strep A Screen - Swab, Throat [[id number]]  (Normal) Collected: 07/31/23 2036    Lab Status: Final result Specimen: Swab from Throat Updated: 07/31/23 2054     Strep A Ag Negative    Narrative:      Test performed by Direct Antigen Testing.    Respiratory  Panel PCR w/COVID-19(SARS-CoV-2) JENNIFER/CARLOS/CARLEE/PAD/COR/MAD/NADJA In-House, NP Swab in UTM/VTM, 3-4 HR TAT - Swab, Nasopharynx [[id number]]  (Normal) Collected: 07/31/23 2036    Lab Status: Final result Specimen: Swab from Nasopharynx Updated: 07/31/23 2134     ADENOVIRUS, PCR Not Detected     Coronavirus 229E Not Detected     Coronavirus HKU1 Not Detected     Coronavirus NL63 Not Detected     Coronavirus OC43 Not Detected     COVID19 Not Detected     Human Metapneumovirus Not Detected     Human Rhinovirus/Enterovirus Not Detected     Influenza A PCR Not Detected     Influenza B PCR Not Detected     Parainfluenza Virus 1 Not Detected     Parainfluenza Virus 2 Not Detected     Parainfluenza Virus 3 Not Detected     Parainfluenza Virus 4 Not Detected     RSV, PCR Not Detected     Bordetella pertussis pcr Not Detected     Bordetella parapertussis PCR Not Detected     Chlamydophila pneumoniae PCR Not Detected     Mycoplasma pneumo by PCR Not Detected    Narrative:      In the setting of a positive respiratory panel with a viral infection PLUS a negative procalcitonin without other underlying concern for bacterial infection, consider observing off antibiotics or discontinuation of antibiotics and continue supportive care. If the respiratory panel is positive for atypical bacterial infection (Bordetella pertussis, Chlamydophila pneumoniae, or Mycoplasma pneumoniae), consider antibiotic de-escalation to target atypical bacterial infection.            CT Soft Tissue Neck With Contrast    Result Date: 7/31/2023  CT SOFT TISSUE NECK W CONTRAST Date of Exam: 7/31/2023 9:34 PM EDT Indication: Right side swelling and dificulty swallowing. Comparison: None available. Technique: Axial CT images were obtained of the neck after the uneventful intravenous administration of 75 mL Isovue-300. .  Reconstructed coronal and sagittal images were also obtained. Automated exposure control and iterative construction methods were used. Findings:  *Edematous and enlarged right submandibular gland with surrounding fat stranding. This finding suggest acute sialadenitis. *Bilateral parotid and left submandibular glands are unremarkable. *Enlarged and edematous palatine tonsils and adenoids causing severe airway narrowing on image 22 series 2. *Extensive soft tissue prominence involving base of tongue which may represent enlarged edematous lingual tonsils. *Epiglottis is unremarkable. *Visualized brain parenchyma is unremarkable. *Visualized orbits and globes are unremarkable. *Neck vessels are patent. *Visualized lung parenchyma is unremarkable. *Cervical spine is without fractures. Spondylosis. *Bilateral neck lymphadenopathy, worse on the right. *    Impression: Impression: 1.Edematous and enlarged right submandibular gland with surrounding fat stranding. This finding suggest acute sialadenitis. 2.Severe palatine tonsillitis/adenitis causing severe airway narrowing. 3.Extensive soft tissue prominence at the base of tongue may represent enlarged and edematous lingual tonsil/tonsillitis. However, follow-up and direct visualization recommended to exclude base of tongue mass. 4.Neck lymphadenopathy, worse on the right. Case discussed with clinician ROBBIE HOPE @ 7/31/2023 10:39 PM EDT. Electronically Signed: Corby Blanco  7/31/2023 10:40 PM EDT  Workstation ID: [id number]         Assessment & Plan   Assessment & Plan       Sialadenitis    Tonsillitis    Benign essential HTN    CHELSY on CPAP    Sialadentitis  Tonsillitis  - ENT consult in am  - NPO  - dexamethasone 10 mg IV given in ED, continue 4mg IV Q 6 hours  - clindamycin 900 mg IV given in ED, continue 600 mg IV Q 6 hours (switch to oral when ok per ENT d/t national shortage of IV clindamycin, d/w pharmacy)  - oral care  - blow by oxygen w/ humidification   - toradol 30 mg IV x 1    CHELSY (CPAP)  - hold CPAP until cleared by ENT, blow by oxygen prn w/ humidification for now    HTN  - hold PO meds until cleared by  "ENT for oral intake  - nicardipine infusion PRN to keep SBP < 180     DVT prophylaxis:  SCDS    CODE STATUS:    Code Status (Patient has no pulse and is not breathing): CPR (Attempt to Resuscitate)  Medical Interventions (Patient has pulse or is breathing): Full Support  Release to patient: Routine Release      Expected Discharge tbd      This note has been completed as part of a split-shared workflow.     Signature: Electronically signed by TYRA Bowers, 08/01/23, 2:08 AM EDT    Total APC time: 50 minutes        Attending   Admission Attestation       I have performed an independent face-to-face diagnostic evaluation including performing an independent physical examination as documented here.  The documented plan of care above was reviewed and developed with the advanced practice clinician (APC).      Brief Summary Statement:   Bandar Sanchez is a 47 y.o. male who states that over the last 24-30 hours he has noticed increased fullness in the right side of his cheek/mouth and right lower jaw.  He states that on Sunday he felt like he had \"a cut on my tongue\" on the right side, and the right-sided jaw fullness started soon after that.  He tried to go to sleep on Sunday night using his CPAP, but he states this did increase the pressure in his neck, causing him to have some dyspnea, which he states he never has.  He confirms sore throat over the last 24 hours, worse with attempting oral intake of food and fluids, but he denies any dysphagia or aspiration.  Denies any recurrence of the shortness of breath, also denies any wheezing/stridor.  He states the pain in the right cheek and right lower jaw has increased ever since onset of the symptoms.  No fever or chills, no recent head/facial trauma.  Work-up in the ED included CT soft tissue neck, the radiology read for which mentions enlarged right submandibular gland and surrounding fat stranding, concerning for acute sialoadenitis.  Also severe tonsillitis/adenitis " which causes severe airway narrowing.  During my visit, his O2 saturations are normal on room air and he is not tachypneic or in any respiratory distress.  No recent dietary or medication changes.    Remainder of detailed HPI is as noted by APC and has been reviewed and/or edited by me for completeness.    Attending Physical Exam:  Temp:  [98.9 øF (37.2 øC)] 98.9 øF (37.2 øC)  Heart Rate:  [73-85] 73  Resp:  [16] 16  BP: (121-178)/() 143/94    Constitutional: Awake, alert, NAD.  Eyes: PERRLA, sclerae anicteric, no conjunctival injection  HENT: NCAT, mucous membranes moist.  Mild oropharyngeal erythema, no lesions, inflamed oral mucosa on the right, tenderness on palpation of firm soft tissues found on the right side of the cheek and jaw.  Neck: Supple, no thyromegaly, right-sided cervical lymphadenopathy, pain on palpation as above  Respiratory: Clear to auscultation bilaterally, nonlabored respirations, no wheezing, no stridor.  Cardiovascular: RRR, no murmurs, rubs, or gallops, palpable pedal pulses bilaterally  Gastrointestinal: Positive bowel sounds, soft, nontender, nondistended  Musculoskeletal: No bilateral ankle edema, no clubbing or cyanosis to extremities  Psychiatric: Appropriate affect, cooperative  Neurologic: Oriented x 3, strength symmetric in all extremities, Cranial Nerves grossly intact to confrontation, speech clear  Skin: No rashes, normal turgor.    Brief Assessment/Plan :  See detailed assessment and plan developed with APC which I have reviewed and/or edited for completeness.    Total time spent: 27 minutes  Time spent includes time reviewing chart, face-to-face time, counseling patient/family/caregiver, ordering medications/tests/procedures, communicating with other health care professionals, documenting clinical information in the electronic health record, and coordination of care.         Kam Vail III, DO  08/01/23

## 2023-08-01 NOTE — CONSULTS
HPI:  I am seeing this patient today in consultation for NECK SWELLING. Patient reports 48 HOUR RIGHT NECK TENDERNESS/SWELLING. Symptoms present for 48H. Attempted treatment includes ANTIBIOTICS AND STEROIDS with SOME relief. EATING makes symptoms worse. Associated symptoms are SLIGHT FOUL TASTE IN HIS MOUTH AND THE SENSATION OF DIFFICULTY BREATHING PRIOR TO ER PRESENTATION.    Pertinent PMH: NO DM, NO PRIOR H/O ACUTE SIALADENITIS  Soc Hx: LIVES W WIFE, DAUGHTER   Fam Hx: NC    PHYSICAL EXAM:  Vitals:    08/01/23 1300   BP:    Pulse: 73   Resp:    Temp:    SpO2: 99%     General: alert, interactive, no acute distress  Ears: hearing NOT TESTED, auricles intact without deformity, external auditory canals CLEAR  Head: normocephalic, atraumatic  Nose: no significant external deformity, no active epistaxis  Mouth: lips intact without deformity  Neck: trachea midline RIGHT MODERATELY SWOLLEN SUBMANDIBULAR GLAND; NO FURTHER CONCERNING PALPABLE LYMPHADENOPATHY; 2+ TONSILS WITHOUT VISIBLE MASS; NO DIFFICULTY BREATHING OR STRIDOR; NO PURULENCE OR VISIBLE STONE INTRAORALLY.   Lung: no stridor or stertor, no respiratory distress  Cardiovascular: no cyanosis  Skin: no concerning skin lesions on face  Neuro: Cranial nerves II-XII otherwise grossly intact  Eye: no pathologic nystagmus  Extremities: no motor asymmetry on gross examination  Psych: appropriately interactive      CT REVIEWED SHOWING NO ABSCESS OR VISIBLE STONE.         ASSESSMENT: RIGHT ACUTE SUBMANDIBULAR SIALADENITIS  PLAN:   Recommend IV DEXAMETHASONE 10MG Q8H X24-48 HOURS THEN TRANSITION TO 7 DAY HIGH DOSE ORAL PREDNISONE TAPER (60MG DAILY X7 DAYS THEN 40MG DAILY X2 DAYS THEN 20MG X1 DAY THEN STOP). RECOMMEND OMEPRAZOLE OR OTHER PPI DURING COURSE OF STEROIDS FOR GI PROPHYLAXIS AGAINST ULCERS.   RECOMMEND ANTIMICROBIAL THERAPY TO COVER ORAL ANAEROBES (CURRENTLY RECEIVING UNASYN - OK TO TRANSITION TO 14 DAYS AMOXICILLIN-CLAVULANATE OR SIMILAR COVERAGE WHEN PATIENT  Pt states the rapaflo is covered but it is more expensive its $500.00 first his first #90 tablets and then $131.00 there after.   A prior authorization can't be done on a covered medication.     Alfuzosin is another alternative.    Please advise. Thanks.      TOLERATES PO BETTER IN THE NEXT DAY OR SO.  RECOMMEND ACUTE SIALADENITIS SUPPORTIVE MEASURES: AGGRESSIVE FLUID INTAKE TO AVOID DEHYDRATION, MASSAGE FROM BELOW EAR TO TIP OF CHIN TO MILK THE GLAND, SOUR CANDIES (SIALOGOGUES), AND CONSIDER WARM COMPRESS FOR COMFORT.  Followup 1MO WITH HENRY MARY AT [facility] [phone].    Please contact me with any ENT-related questions/concerns.    Signed: GABRIELLE Dickerson MD  [facility]

## 2023-08-01 NOTE — PLAN OF CARE
Goal Outcome Evaluation:              Outcome Evaluation: Pt arrived to floor from ED around 0350 AM with swollen adenoids/tonsils. AOx4 on RA. NSR on monitor. Hypertensive, but asymptomatic. SBP <180 so nicardipine drip held per orders. Patient NPO until ENT evaluation. Patient states wearing CPAP at home, but RN instructed to hold until ENT evaluation in AM. Complaints of aching in left side of neck, but no medications requested per patient. All oral medications to be held until ENT evaluation per orders. Will continue to monitor closely and provide care as appropriate per provider orders.

## 2023-08-01 NOTE — PROGRESS NOTES
[facility] Medicine Services  ADMISSION FOLLOW-UP NOTE          Patient admitted after midnight, H&P by my partner performed earlier on today's date reviewed.  Interim findings, labs, and charting also reviewed.        The [facility] Hospital Problem List has been managed and updated to include any new diagnoses:  Active Hospital Problems    Diagnosis  POA    **Sialadenitis [K11.20]  Yes    Tonsillitis [J03.90]  Unknown    Benign essential HTN [I10]  Unknown    CHELSY on CPAP [G47.33, Z99.89]  Not Applicable      Resolved Hospital Problems   No resolved problems to display.         ADDITIONAL PLAN:  - detailed assessment and plan from admission reviewed    Sialadentitis  Tonsillitis  - ENT consult pending.  - Continue decadron 4mg IV Q 6 hours  - Switch to Unasyn given unavailability of Clinda.  - Oral care.  - Pain control.     CHELSY (CPAP)  - hold CPAP until cleared by ENT, blow by oxygen prn w/ humidification for now     HTN  - hold PO meds until cleared by ENT for oral intake  - nicardipine infusion PRN to keep SBP < 180    Expected Discharge  Expected Discharge Date: 8/2/2023; Expected Discharge Time:      Shanell Cox II, DO  08/01/23

## 2023-08-01 NOTE — PROGRESS NOTES
"Nutrition Services    Patient Name:  Bandar Sanchez  YOB: 1975  MRN: [id number]  Admit Date:  7/31/2023    Patient identified to be at nutrition risk per nurse admission screen based on indicator of \"difficulty chewing/swallowing\". At this time patient has regular textures/thin liquids PO diet ordered and does not currently meet screen criteria for nutrition risk. RD will continue to follow per protocol.    Electronically signed by:  Selina Main RD  08/01/23 15:10 EDT   "

## 2023-08-01 NOTE — PLAN OF CARE
Goal Outcome Evaluation:  Plan of Care Reviewed With: patient        Progress: improving  Outcome Evaluation: VSS. RA. Alert and oriented x4. NSR on the monitor. Pt complaints of pain. PRN's given for pain control. No complaints of nausea. Pt ambulating independently. IV fluids, steriods, and antibiotics infusing. Right sided neck swelling continued. Spouse at bedside. Pt resting comfortably. No further complaints at this time.

## 2023-08-02 ENCOUNTER — READMISSION MANAGEMENT (OUTPATIENT)
Dept: CALL CENTER | Facility: HOSPITAL | Age: 48
End: 2023-08-02
Payer: COMMERCIAL

## 2023-08-02 VITALS
HEIGHT: 70 IN | DIASTOLIC BLOOD PRESSURE: 106 MMHG | HEART RATE: 65 BPM | BODY MASS INDEX: 32.93 KG/M2 | SYSTOLIC BLOOD PRESSURE: 160 MMHG | WEIGHT: 230 LBS | OXYGEN SATURATION: 97 % | TEMPERATURE: 97.9 F | RESPIRATION RATE: 18 BRPM

## 2023-08-02 PROBLEM — J03.90 TONSILLITIS: Status: RESOLVED | Noted: 2023-08-01 | Resolved: 2023-08-02

## 2023-08-02 PROBLEM — K11.20 SIALADENITIS: Status: RESOLVED | Noted: 2023-08-01 | Resolved: 2023-08-02

## 2023-08-02 LAB — BACTERIA SPEC AEROBE CULT: ABNORMAL

## 2023-08-02 PROCEDURE — 25010000002 AMPICILLIN-SULBACTAM PER 1.5 G: Performed by: NURSE PRACTITIONER

## 2023-08-02 PROCEDURE — 96366 THER/PROPH/DIAG IV INF ADDON: CPT

## 2023-08-02 PROCEDURE — 96376 TX/PRO/DX INJ SAME DRUG ADON: CPT

## 2023-08-02 PROCEDURE — G0378 HOSPITAL OBSERVATION PER HR: HCPCS

## 2023-08-02 PROCEDURE — 25010000002 MORPHINE PER 10 MG: Performed by: INTERNAL MEDICINE

## 2023-08-02 PROCEDURE — 25010000002 DEXAMETHASONE PER 1 MG: Performed by: OTOLARYNGOLOGY

## 2023-08-02 RX ORDER — PANTOPRAZOLE SODIUM 40 MG/1
40 TABLET, DELAYED RELEASE ORAL DAILY
Qty: 14 TABLET | Refills: 0 | Status: SHIPPED | OUTPATIENT
Start: 2023-08-02

## 2023-08-02 RX ORDER — LOSARTAN POTASSIUM 50 MG/1
50 TABLET ORAL
Qty: 30 TABLET | Refills: 1 | Status: SHIPPED | OUTPATIENT
Start: 2023-08-03

## 2023-08-02 RX ORDER — AMLODIPINE BESYLATE 10 MG/1
10 TABLET ORAL
Qty: 30 TABLET | Refills: 1 | Status: SHIPPED | OUTPATIENT
Start: 2023-08-03

## 2023-08-02 RX ORDER — PREDNISONE 20 MG/1
TABLET ORAL
Qty: 27 TABLET | Refills: 0 | Status: SHIPPED | OUTPATIENT
Start: 2023-08-02 | End: 2023-08-13

## 2023-08-02 RX ORDER — AMOXICILLIN AND CLAVULANATE POTASSIUM 875; 125 MG/1; MG/1
1 TABLET, FILM COATED ORAL 2 TIMES DAILY
Qty: 22 TABLET | Refills: 0 | Status: SHIPPED | OUTPATIENT
Start: 2023-08-02 | End: 2023-08-13

## 2023-08-02 RX ADMIN — SODIUM CHLORIDE 3 G: 900 INJECTION INTRAVENOUS at 01:46

## 2023-08-02 RX ADMIN — MORPHINE SULFATE 2 MG: 2 INJECTION, SOLUTION INTRAMUSCULAR; INTRAVENOUS at 06:12

## 2023-08-02 RX ADMIN — AMLODIPINE BESYLATE 10 MG: 10 TABLET ORAL at 08:01

## 2023-08-02 RX ADMIN — LOSARTAN POTASSIUM 25 MG: 25 TABLET, FILM COATED ORAL at 08:01

## 2023-08-02 RX ADMIN — SODIUM CHLORIDE 3 G: 900 INJECTION INTRAVENOUS at 08:01

## 2023-08-02 RX ADMIN — HYDROCODONE BITARTRATE AND ACETAMINOPHEN 10 ML: 7.5; 325 SOLUTION ORAL at 04:50

## 2023-08-02 RX ADMIN — METOPROLOL SUCCINATE 100 MG: 100 TABLET, EXTENDED RELEASE ORAL at 08:01

## 2023-08-02 RX ADMIN — DEXAMETHASONE SODIUM PHOSPHATE 10 MG: 4 INJECTION, SOLUTION INTRAMUSCULAR; INTRAVENOUS at 04:53

## 2023-08-02 RX ADMIN — HYDROCHLOROTHIAZIDE 12.5 MG: 12.5 CAPSULE ORAL at 08:01

## 2023-08-02 NOTE — PLAN OF CARE
Goal Outcome Evaluation:              Outcome Evaluation: AOx4 on RA. NSR on monitor. Hypertensive, but asymptomatic. SBP <180 still. Warm compress applied and PRN meds given for pain. Able to take oral medications and tolerate diet with some pain. Pt expresses wanting to go home. Will continue to monitor closely and provide care as appropriate per provider orders.

## 2023-08-02 NOTE — DISCHARGE SUMMARY
[facility] Medicine Services  DISCHARGE SUMMARY    Patient Name: Bandar Sanchez  : 1975  MRN: [id number]    Date of Admission: 2023  7:58 PM  Date of Discharge:  2023  Primary Care Physician: Leoncio De León MD    Consults       Date and Time Order Name Status Description    2023  3:26 AM Inpatient ENT Consult Completed             Hospital Course     Presenting Problem: tonsilitis    Active Hospital Problems    Diagnosis  POA    Benign essential HTN [I10]  Unknown    CHELSY on CPAP [G47.33, Z99.89]  Not Applicable      Resolved Hospital Problems    Diagnosis Date Resolved POA    Sialadenitis [K11.20] 2023 Yes    Tonsillitis [J03.90] 2023 Unknown          Hospital Course:  Bandar Sanchez is a 47 y.o. male presenting with jaw pain found to have severe sialadentitis/tonsillitis.    Sialadentitis  Tonsillitis  - ENT evaluated patient on arrival. He was initially treated with high dose IV steroids and IV abx along with oral pain medication which quickly improved his pain. Per recs will continue high dose prednisone taper, augmentin x 11 more days. He was instructed to continue use of warm compresses and sour candies. F/U ENT 1 month.    Discharge Follow Up Recommendations for outpatient labs/diagnostics:   PCP 1-2 weeks   ENT 1 month    Day of Discharge     HPI: Up in bed. Tolerating clears, soft foods. Pain has improved. Asking to go home.    Review of Systems  Gen- No fevers, chills  CV- No chest pain, palpitations  Resp- No cough, dyspnea  GI- No N/V/D, abd pain    Vital Signs:   Temp:  [97.9 øF (36.6 øC)-98.4 øF (36.9 øC)] 97.9 øF (36.6 øC)  Heart Rate:  [60-96] 68  Resp:  [16-20] 18  BP: (147-185)/(103-119) 161/116      Physical Exam:  Constitutional: No acute distress, awake, alert, appears comfortable  HENT: NCAT, mucous membranes moist, right jaw/neck swelling with some mild TTP  Respiratory: Clear to auscultation bilaterally, respiratory effort normal    Cardiovascular: RRR, no murmurs, rubs, or gallops  Gastrointestinal: Positive bowel sounds, soft, nontender, nondistended  Musculoskeletal: No bilateral ankle edema  Psychiatric: Appropriate affect, cooperative  Neurologic: Oriented x 3, strength symmetric in all extremities, Cranial Nerves grossly intact to confrontation, speech clear  Skin: No rashes     Pertinent  and/or Most Recent Results     LAB RESULTS:      Lab 08/01/23 0700 07/31/23 2050   WBC 4.63 6.42   HEMOGLOBIN 15.3 15.2   HEMATOCRIT 44.4 44.6   PLATELETS 220 219   NEUTROS ABS 3.93 2.78   IMMATURE GRANS (ABS) 0.02 0.01   LYMPHS ABS 0.61* 2.97   MONOS ABS 0.07* 0.56   EOS ABS 0.00 0.08   MCV 83.9 85.8         Lab 08/01/23 0700 07/31/23 2050   SODIUM 130* 138   POTASSIUM 4.4 3.8   CHLORIDE 96* 98   CO2 24.0 27.0   ANION GAP 10.0 13.0   BUN 15 18   CREATININE 1.07 1.21   EGFR 86.7 74.8   GLUCOSE 196* 129*   CALCIUM 9.5 9.4   MAGNESIUM 2.5  --          Lab 07/31/23 2050   TOTAL PROTEIN 8.1   ALBUMIN 5.0   GLOBULIN 3.1   ALT (SGPT) 21   AST (SGOT) 25   BILIRUBIN 0.6   ALK PHOS 43                     Brief Urine Lab Results       None          Microbiology Results (last 10 days)       Procedure Component Value - Date/Time    COVID PRE-OP / PRE-PROCEDURE SCREENING ORDER (NO ISOLATION) - Swab, Nasopharynx [[id number]]  (Normal) Collected: 07/31/23 2036    Lab Status: Final result Specimen: Swab from Nasopharynx Updated: 07/31/23 2134    Narrative:      The following orders were created for panel order COVID PRE-OP / PRE-PROCEDURE SCREENING ORDER (NO ISOLATION) - Swab, Nasopharynx.  Procedure                               Abnormality         Status                     ---------                               -----------         ------                     Respiratory Panel PCR w/...[[id number]]  Normal              Final result                 Please view results for these tests on the individual orders.    Rapid Strep A Screen - Swab, Throat [[id number]]   (Normal) Collected: 07/31/23 2036    Lab Status: Final result Specimen: Swab from Throat Updated: 07/31/23 2054     Strep A Ag Negative    Narrative:      Test performed by Direct Antigen Testing.    Respiratory Panel PCR w/COVID-19(SARS-CoV-2) JENNIFER/CARLOS/CARLEE/PAD/COR/MAD/NADJA In-House, NP Swab in UTM/VTM, 3-4 HR TAT - Swab, Nasopharynx [[id number]]  (Normal) Collected: 07/31/23 2036    Lab Status: Final result Specimen: Swab from Nasopharynx Updated: 07/31/23 2134     ADENOVIRUS, PCR Not Detected     Coronavirus 229E Not Detected     Coronavirus HKU1 Not Detected     Coronavirus NL63 Not Detected     Coronavirus OC43 Not Detected     COVID19 Not Detected     Human Metapneumovirus Not Detected     Human Rhinovirus/Enterovirus Not Detected     Influenza A PCR Not Detected     Influenza B PCR Not Detected     Parainfluenza Virus 1 Not Detected     Parainfluenza Virus 2 Not Detected     Parainfluenza Virus 3 Not Detected     Parainfluenza Virus 4 Not Detected     RSV, PCR Not Detected     Bordetella pertussis pcr Not Detected     Bordetella parapertussis PCR Not Detected     Chlamydophila pneumoniae PCR Not Detected     Mycoplasma pneumo by PCR Not Detected    Narrative:      In the setting of a positive respiratory panel with a viral infection PLUS a negative procalcitonin without other underlying concern for bacterial infection, consider observing off antibiotics or discontinuation of antibiotics and continue supportive care. If the respiratory panel is positive for atypical bacterial infection (Bordetella pertussis, Chlamydophila pneumoniae, or Mycoplasma pneumoniae), consider antibiotic de-escalation to target atypical bacterial infection.    Beta Strep Culture, Throat - Swab, Throat [[id number]]  (Abnormal) Collected: 07/31/23 2036    Lab Status: Final result Specimen: Swab from Throat Updated: 08/02/23 0831     Throat Culture, Beta Strep Streptococcus, Beta Hemolytic, Not Group A     Comment:   This organism is  considered to be universally susceptible to penicillin.  No further antibiotic testing will be performed. If Clindamycin or Erythromycin is the drug of choice, notify the laboratory within 7 days to request susceptibility testing.       Narrative:      Group A Strep incidence is low in adults. Positive culture for Beta hemolytic Streptococcus species can reflect colonization and not true infection. Please correlate clinically.            CT Soft Tissue Neck With Contrast    Result Date: 7/31/2023  CT SOFT TISSUE NECK W CONTRAST Date of Exam: 7/31/2023 9:34 PM EDT Indication: Right side swelling and dificulty swallowing. Comparison: None available. Technique: Axial CT images were obtained of the neck after the uneventful intravenous administration of 75 mL Isovue-300. .  Reconstructed coronal and sagittal images were also obtained. Automated exposure control and iterative construction methods were used. Findings: *Edematous and enlarged right submandibular gland with surrounding fat stranding. This finding suggest acute sialadenitis. *Bilateral parotid and left submandibular glands are unremarkable. *Enlarged and edematous palatine tonsils and adenoids causing severe airway narrowing on image 22 series 2. *Extensive soft tissue prominence involving base of tongue which may represent enlarged edematous lingual tonsils. *Epiglottis is unremarkable. *Visualized brain parenchyma is unremarkable. *Visualized orbits and globes are unremarkable. *Neck vessels are patent. *Visualized lung parenchyma is unremarkable. *Cervical spine is without fractures. Spondylosis. *Bilateral neck lymphadenopathy, worse on the right. *    Impression: 1.Edematous and enlarged right submandibular gland with surrounding fat stranding. This finding suggest acute sialadenitis. 2.Severe palatine tonsillitis/adenitis causing severe airway narrowing. 3.Extensive soft tissue prominence at the base of tongue may represent enlarged and edematous  lingual tonsil/tonsillitis. However, follow-up and direct visualization recommended to exclude base of tongue mass. 4.Neck lymphadenopathy, worse on the right. Case discussed with clinician ROBBIE HOPE @ 7/31/2023 10:39 PM EDT. Electronically Signed: Corby Francis  7/31/2023 10:40 PM EDT  Workstation ID: [id number]    HOME SLEEP APNEA TESTING    Result Date: 7/25/2023  This result has an attachment that is not available. Please see results of the sleep study attached to the sleep study order.                  Plan for Follow-up of Pending Labs/Results:     Discharge Details        Discharge Medications        New Medications        Instructions Start Date   amLODIPine 10 MG tablet  Commonly known as: NORVASC   10 mg, Oral, Every 24 Hours Scheduled   Start Date: August 3, 2023     amoxicillin-clavulanate 875-125 MG per tablet  Commonly known as: AUGMENTIN   1 tablet, Oral, 2 Times Daily      HYDROcodone-acetaminophen 7.5-325 MG/15ML solution  Commonly known as: HYCET  Replaces: HYDROcodone-acetaminophen 5-325 MG per tablet   10 mL, Oral, Every 4 Hours PRN             Changes to Medications        Instructions Start Date   losartan 50 MG tablet  Commonly known as: COZAAR  What changed:   medication strength  how much to take  when to take this   50 mg, Oral, Every 24 Hours Scheduled   Start Date: August 3, 2023     predniSONE 20 MG tablet  Commonly known as: DELTASONE  What changed:   medication strength  See the new instructions.   Take 3 tablets by mouth Daily for 7 days, THEN 2 tablets Daily for 2 days, THEN 1 tablet Daily for 2 days.   Start Date: August 2, 2023            Continue These Medications        Instructions Start Date   hydroCHLOROthiazide 12.5 MG capsule  Commonly known as: MICROZIDE   12.5 mg, Oral, Daily      metoprolol succinate  MG 24 hr tablet  Commonly known as: TOPROL-XL   100 mg, Oral, Daily             Stop These Medications      amLODIPine-benazepril 10-20 MG per capsule  Commonly known  as: LOTREL     HYDROcodone-acetaminophen 5-325 MG per tablet  Commonly known as: NORCO  Replaced by: HYDROcodone-acetaminophen 7.5-325 MG/15ML solution              Allergies   Allergen Reactions    Lisinopril Anaphylaxis         Discharge Disposition:      Diet:  Hospital:  Diet Order   Procedures    Diet: Regular/House Diet; Texture: Regular Texture (IDDSI 7); Fluid Consistency: Thin (IDDSI 0)       Activity:      Restrictions or Other Recommendations:         CODE STATUS:    Code Status and Medical Interventions:   Ordered at: 08/01/23 0153     Code Status (Patient has no pulse and is not breathing):    CPR (Attempt to Resuscitate)     Medical Interventions (Patient has pulse or is breathing):    Full Support     Release to patient:    Routine Release       No future appointments.    Additional Instructions for the Follow-ups that You Need to Schedule       Discharge Follow-up with PCP   As directed       Currently Documented PCP:    Leoncio De León MD    PCP Phone Number:    [phone]     Follow Up Details: 1-2 week hospital follow up        Discharge Follow-up with Specified Provider: ENT; 1 Month   As directed      To: ENT   Follow Up: 1 Month                      Shanell Cox II, DO  08/02/23      Time Spent on Discharge:  I spent  34  minutes on this discharge activity which included: face-to-face encounter with the patient, reviewing the data in the system, coordination of the care with the nursing staff as well as consultants, documentation, and entering orders.

## 2023-08-02 NOTE — PLAN OF CARE
Problem: Adult Inpatient Plan of Care  Goal: Plan of Care Review  Outcome: Met  Flowsheets (Taken 8/2/2023 0943)  Outcome Evaluation: VSS, pt breathing well on RA. Report mild discomfort at rest, and moderate discomfort with swallowing. Able to tolerate soft foods po. Pt to be discharged today.  Goal: Patient-Specific Goal (Individualized)  Outcome: Met  Goal: Absence of Hospital-Acquired Illness or Injury  Outcome: Met  Intervention: Identify and Manage Fall Risk  Recent Flowsheet Documentation  Taken 8/2/2023 0800 by Maye Atwood RN  Safety Promotion/Fall Prevention:   activity supervised   fall prevention program maintained   nonskid shoes/slippers when out of bed   safety round/check completed  Intervention: Prevent Skin Injury  Recent Flowsheet Documentation  Taken 8/2/2023 0800 by Maye Atwood RN  Body Position:   supine   position changed independently  Skin Protection: adhesive use limited  Intervention: Prevent and Manage VTE (Venous Thromboembolism) Risk  Recent Flowsheet Documentation  Taken 8/2/2023 0800 by Maye Atwood RN  Activity Management: up ad river  Range of Motion: active ROM (range of motion) encouraged  Goal: Optimal Comfort and Wellbeing  Outcome: Met  Intervention: Provide Person-Centered Care  Recent Flowsheet Documentation  Taken 8/2/2023 0800 by Maye Atwood RN  Trust Relationship/Rapport:   care explained   thoughts/feelings acknowledged  Goal: Readiness for Transition of Care  Outcome: Met   Goal Outcome Evaluation:              Outcome Evaluation: VSS, pt breathing well on RA. Report mild discomfort at rest, and moderate discomfort with swallowing. Able to tolerate soft foods po. Pt to be discharged today.

## 2023-08-03 ENCOUNTER — TRANSITIONAL CARE MANAGEMENT TELEPHONE ENCOUNTER (OUTPATIENT)
Dept: CALL CENTER | Facility: HOSPITAL | Age: 48
End: 2023-08-03
Payer: COMMERCIAL

## 2023-08-03 RX ORDER — NALOXONE HYDROCHLORIDE 4 MG/.1ML
1 SPRAY NASAL AS NEEDED
Qty: 2 EACH | Refills: 0 | Status: SHIPPED | OUTPATIENT
Start: 2023-08-03

## 2023-08-07 ENCOUNTER — OFFICE VISIT (OUTPATIENT)
Dept: FAMILY MEDICINE CLINIC | Facility: CLINIC | Age: 48
End: 2023-08-07
Payer: COMMERCIAL

## 2023-08-07 VITALS
BODY MASS INDEX: 33.18 KG/M2 | OXYGEN SATURATION: 98 % | TEMPERATURE: 98.4 F | DIASTOLIC BLOOD PRESSURE: 90 MMHG | HEIGHT: 70 IN | SYSTOLIC BLOOD PRESSURE: 152 MMHG | HEART RATE: 90 BPM | WEIGHT: 231.8 LBS

## 2023-08-07 DIAGNOSIS — I10 PRIMARY HYPERTENSION: ICD-10-CM

## 2023-08-07 DIAGNOSIS — K11.20 SIALADENITIS: Primary | ICD-10-CM

## 2023-08-07 DIAGNOSIS — J03.90 TONSILLITIS: ICD-10-CM

## 2023-08-07 DIAGNOSIS — G47.30 SLEEP APNEA TREATED WITH CONTINUOUS POSITIVE AIRWAY PRESSURE (CPAP): ICD-10-CM

## 2023-08-07 NOTE — PATIENT INSTRUCTIONS
Contact ENT to let them know you are not having any improvement.   Drink plenty of fluids  Take medications as prescribed

## 2023-08-07 NOTE — PROGRESS NOTES
Subjective   Bandar Sanchez is a 47 y.o. male here to establish care.      Below copied from hospital discharge summary:      Expand All Collapse All         [facility] Medicine Services  DISCHARGE SUMMARY     Patient Name: Bandar Sanchez  : 1975  MRN: [id number]     Date of Admission: 2023  7:58 PM  Date of Discharge:  2023  Primary Care Physician: Leoncio De León MD     Consults         Date and Time Order Name Status Description     2023  3:26 AM Inpatient ENT Consult Completed                  Hospital Course      Presenting Problem: tonsilitis           Active Hospital Problems     Diagnosis   POA    Benign essential HTN [I10]   Unknown    CHELSY on CPAP [G47.33, Z99.89]   Not Applicable       Resolved Hospital Problems     Diagnosis Date Resolved POA    Sialadenitis [K11.20] 2023 Yes    Tonsillitis [J03.90] 2023 Unknown            Hospital Course:  Bandar Sanchez is a 47 y.o. male presenting with jaw pain found to have severe sialadentitis/tonsillitis.     Sialadentitis  Tonsillitis  - ENT evaluated patient on arrival. He was initially treated with high dose IV steroids and IV abx along with oral pain medication which quickly improved his pain. Per recs will continue high dose prednisone taper, augmentin x 11 more days. He was instructed to continue use of warm compresses and sour candies. F/U ENT 1 month.        ESTABLISH CARE/YASMINE NOTE:  Patient is here for hospital follow up and to establish care.  States was sitting in his house turned his head, had pain on right neck, felt a knot, was getting larger, interfering with breathing. Admitted to hospital, discharged on antibiotics and prednisone  Was out of BP meds for a few days before admission, now back on these  CPAP has been broken for about a month, had repeat sleep study, should get cpap next week  Continues to have sore throat and swallowing difficulty. Is scheduled with ENT     The following  "portions of the patient's history were reviewed and updated as appropriate: allergies, current medications, past family history, past medical history, past social history, past surgical history, and problem list.    Review of Systems   Constitutional:  Positive for fatigue. Negative for chills and fever.   HENT:  Positive for ear pain (right) and sore throat. Negative for congestion.    Respiratory:  Positive for apnea (CPAP not working, has new one ordered). Negative for shortness of breath and wheezing.    Cardiovascular:  Negative for chest pain, palpitations and leg swelling.   Gastrointestinal:  Positive for diarrhea (with abx). Negative for nausea.   Genitourinary:  Negative for difficulty urinating and dysuria.   Skin:  Negative for color change, pallor, rash and wound.   Neurological:  Positive for headaches. Negative for dizziness and light-headedness.   Psychiatric/Behavioral:  Positive for dysphoric mood and sleep disturbance. Negative for self-injury and suicidal ideas. The patient is nervous/anxious.    Blood pressure 152/90, pulse 90, temperature 98.4 øF (36.9 øC), temperature source Oral, height 177.8 cm (70\"), weight 105 kg (231 lb 12.8 oz), SpO2 98 %.    Allergies   Allergen Reactions    Lisinopril Anaphylaxis     Past Medical History:   Diagnosis Date    Acute gout involving toe of left foot     Hypertension     Tonsillitis      Past Surgical History:   Procedure Laterality Date    COLONOSCOPY  2023     Family History   Problem Relation Age of Onset    Cerebral aneurysm Mother 54    Breast cancer Maternal Grandmother     Cerebral aneurysm Maternal Aunt     Cerebral aneurysm Maternal Aunt     Cerebral aneurysm Maternal Aunt     Cerebral aneurysm Maternal Uncle      Social History     Socioeconomic History    Marital status:    Tobacco Use    Smoking status: Former     Packs/day: 2.00     Years: 16.00     Pack years: 32.00     Types: Cigars, Cigarettes     Start date: 1/1/1999     " Quit date:      Years since quittin.6     Passive exposure: Never   Vaping Use    Vaping Use: Some days    Substances: Nicotine    Devices: Disposable   Substance and Sexual Activity    Alcohol use: Yes     Alcohol/week: 2.0 standard drinks     Types: 2 Cans of beer per week    Drug use: Not Currently     Types: Marijuana     Comment: occ    Sexual activity: Yes     Partners: Female       There is no immunization history on file for this patient.    Current Outpatient Medications:     amLODIPine (NORVASC) 10 MG tablet, Take 1 tablet by mouth Daily., Disp: 30 tablet, Rfl: 1    amoxicillin-clavulanate (AUGMENTIN) 875-125 MG per tablet, Take 1 tablet by mouth 2 (Two) Times a Day for 11 days., Disp: 22 tablet, Rfl: 0    hydroCHLOROthiazide (MICROZIDE) 12.5 MG capsule, Take 1 capsule by mouth Daily., Disp: , Rfl:     losartan (COZAAR) 50 MG tablet, Take 1 tablet by mouth Daily., Disp: 30 tablet, Rfl: 1    metoprolol succinate XL (TOPROL-XL) 100 MG 24 hr tablet, Take 1 tablet by mouth Daily., Disp: , Rfl:     naloxone (NARCAN) 4 MG/0.1ML nasal spray, Call 911. Don't prime. [place] in 1 nostril for overdose. Repeat in 2-3 minutes in other nostril if no or minimal breathing/responsiveness., Disp: 2 each, Rfl: 0    pantoprazole (Protonix) 40 MG EC tablet, Take 1 tablet by mouth Daily., Disp: 14 tablet, Rfl: 0    predniSONE (DELTASONE) 20 MG tablet, Take 3 tablets by mouth Daily for 7 days, THEN 2 tablets Daily for 2 days, THEN 1 tablet Daily for 2 days., Disp: 27 tablet, Rfl: 0    Objective   Physical Exam  Vitals reviewed.   Constitutional:       General: He is not in acute distress.     Appearance: Normal appearance. He is not ill-appearing, toxic-appearing or diaphoretic.   HENT:      Head: Normocephalic and atraumatic.      Right Ear: Tympanic membrane normal.      Left Ear: Tympanic membrane normal.      Mouth/Throat:      Mouth: Mucous membranes are moist.      Pharynx: Posterior oropharyngeal  erythema present. No oropharyngeal exudate.   Cardiovascular:      Rate and Rhythm: Normal rate and regular rhythm.   Pulmonary:      Effort: Pulmonary effort is normal. No respiratory distress.      Breath sounds: Normal breath sounds.   Lymphadenopathy:      Cervical: Cervical adenopathy (right firm submandibular golf sized mass) present.   Neurological:      Mental Status: He is alert and oriented to person, place, and time.   Psychiatric:         Behavior: Behavior normal.         Thought Content: Thought content normal.         Judgment: Judgment normal.       Assessment & Plan   Diagnoses and all orders for this visit:    1. Sialadenitis (Primary)    2. Tonsillitis    3. Sleep apnea treated with continuous positive airway pressure (CPAP)    4. Primary hypertension      No orders of the defined types were placed in this encounter.    Continue antibiotics as prescribed, encouraged patient to call ENT to let them know the swelling in his right neck is not improving, encouraged drinking plenty of fluids. Follow instructions ENT gave during admission  Follow up with [facility] for CPAP machine. HTN is not controlled,continue current medications, follow up in one month to see if CPAP use improves BP  Patient was encouraged to keep me informed of any acute changes, lack of improvement, or any new concerning symptoms.    I spent a total of 45 minutes before, during and after the visit reviewing records, updating history and care gaps, educating the patient about his/her condition, ordering labs and prescriptions.

## 2023-09-07 ENCOUNTER — OFFICE VISIT (OUTPATIENT)
Dept: FAMILY MEDICINE CLINIC | Facility: CLINIC | Age: 48
End: 2023-09-07
Payer: COMMERCIAL

## 2023-09-07 VITALS
TEMPERATURE: 98.4 F | HEIGHT: 70 IN | WEIGHT: 235.2 LBS | HEART RATE: 84 BPM | SYSTOLIC BLOOD PRESSURE: 146 MMHG | DIASTOLIC BLOOD PRESSURE: 100 MMHG | BODY MASS INDEX: 33.67 KG/M2 | OXYGEN SATURATION: 98 %

## 2023-09-07 DIAGNOSIS — I10 PRIMARY HYPERTENSION: Primary | ICD-10-CM

## 2023-09-07 DIAGNOSIS — K11.20 SIALADENITIS: ICD-10-CM

## 2023-09-07 DIAGNOSIS — F43.10 PTSD (POST-TRAUMATIC STRESS DISORDER): ICD-10-CM

## 2023-09-07 DIAGNOSIS — G47.30 SLEEP APNEA TREATED WITH CONTINUOUS POSITIVE AIRWAY PRESSURE (CPAP): ICD-10-CM

## 2023-09-07 DIAGNOSIS — R22.0 MASS OF RIGHT SUBMANDIBULAR REGION: ICD-10-CM

## 2023-09-07 DIAGNOSIS — F32.9 REACTIVE DEPRESSION: ICD-10-CM

## 2023-09-07 PROCEDURE — 3080F DIAST BP >= 90 MM HG: CPT | Performed by: NURSE PRACTITIONER

## 2023-09-07 PROCEDURE — 3077F SYST BP >= 140 MM HG: CPT | Performed by: NURSE PRACTITIONER

## 2023-09-07 PROCEDURE — 99214 OFFICE O/P EST MOD 30 MIN: CPT | Performed by: NURSE PRACTITIONER

## 2023-09-07 RX ORDER — LOSARTAN POTASSIUM 100 MG/1
100 TABLET ORAL
Qty: 90 TABLET | Refills: 1 | Status: SHIPPED | OUTPATIENT
Start: 2023-09-07

## 2023-09-07 NOTE — LETTER
September 7, 2023     Patient: Bandar Sanchez   YOB: 1975   Date of Visit: 9/7/2023       To Whom It May Concern:    Mr. Sanchez was in my office today for follow up. We updated his medication list which included medical marijuana. I am aware that he uses medical marijuana and have seen his medical marijuana license.        Sincerely,        TYRA Alcala    CC: No Recipients

## 2023-09-07 NOTE — PROGRESS NOTES
Subjective   Bandar Sanchez is a 47 y.o. male.   Chief Complaint   Patient presents with    Hypertension    Sialadenitis     Neck Pain    Neck Pain       Hypertension  Associated symptoms include headaches and neck pain. Pertinent negatives include no chest pain, palpitations or shortness of breath.   Neck Pain   Associated symptoms include headaches and trouble swallowing (pain). Pertinent negatives include no chest pain or fever.    Patient is here for follow up for sialadenitis,right submandibular mass. States the mass swells more whenever he eats, is painful. Tries to drink water, sucking on hard candy makes the pain worse.   He has had a lot going on in his family, his wife is with him today. He has been prescribed medical marijuana by a DO in [place],he had several family members, including his mother die in a short time period. Now his son is recovering from an accident and has been charged with murder. This is all taking a toll on patient, is interested in talking to a therapist.  He has started using his new CPAP, , does not check BP at home.   The following portions of the patient's history were reviewed and updated as appropriate: allergies, current medications, past family history, past medical history, past social history, past surgical history, and problem list.    Review of Systems   Constitutional:  Positive for fatigue. Negative for chills and fever.   HENT:  Positive for ear pain (right), sore throat and trouble swallowing (pain). Negative for congestion.    Respiratory:  Positive for apnea (now has CPAP). Negative for shortness of breath and wheezing.    Cardiovascular:  Negative for chest pain, palpitations and leg swelling.   Gastrointestinal:  Negative for nausea. Diarrhea: with abx.  Genitourinary:  Negative for difficulty urinating and dysuria.   Musculoskeletal:  Positive for neck pain.   Skin:  Negative for color change, pallor, rash and wound.   Neurological:  Positive for headaches.  "Negative for dizziness and light-headedness.   Psychiatric/Behavioral:  Positive for dysphoric mood and sleep disturbance. Negative for self-injury and suicidal ideas. The patient is nervous/anxious.      Objective   Physical Exam  Vitals reviewed.   Constitutional:       General: He is not in acute distress.     Appearance: Normal appearance. He is not ill-appearing, toxic-appearing or diaphoretic.   HENT:      Head: Normocephalic and atraumatic.      Right Ear: Tympanic membrane normal.      Left Ear: Tympanic membrane normal.   Eyes:      General: No scleral icterus.  Neck:      Vascular: No carotid bruit.      Comments: Right submandibular mass; tender  Cardiovascular:      Rate and Rhythm: Normal rate and regular rhythm.      Heart sounds: Normal heart sounds.   Pulmonary:      Effort: Pulmonary effort is normal. No respiratory distress.      Breath sounds: Normal breath sounds.   Neurological:      Mental Status: He is alert.   Psychiatric:         Mood and Affect: Mood is anxious and depressed. Affect is tearful (when discussing the situation his son is in and how this affects his family).         Speech: Speech normal.         Behavior: Behavior normal.         Thought Content: Thought content normal.         Cognition and Memory: Cognition normal.         Judgment: Judgment normal.     /100 (BP Location: Right arm, Patient Position: Sitting, Cuff Size: Large Adult)   Pulse 84   Temp 98.4 °F (36.9 °C) (Oral)   Ht 177.8 cm (70\")   Wt 107 kg (235 lb 3.2 oz)   SpO2 98%   BMI 33.75 kg/m²     Assessment & Plan   Problems Addressed this Visit          ENT    Sialadenitis    Relevant Orders    Ambulatory Referral to ENT (Otolaryngology)       Mental Health    PTSD (post-traumatic stress disorder)       Other    Sleep apnea treated with continuous positive airway pressure (CPAP)     Other Visit Diagnoses       Primary hypertension    -  Primary    Relevant Medications    losartan (COZAAR) 100 MG tablet "    Mass of right submandibular region        Relevant Orders    Ambulatory Referral to ENT (Otolaryngology)    Reactive depression        Relevant Orders    Ambulatory Referral to Behavioral Health          Diagnoses         Codes Comments    Primary hypertension    -  Primary ICD-10-CM: I10  ICD-9-CM: 401.9     Mass of right submandibular region     ICD-10-CM: R22.0  ICD-9-CM: 784.2     Sialadenitis     ICD-10-CM: K11.20  ICD-9-CM: 527.2     PTSD (post-traumatic stress disorder)     ICD-10-CM: F43.10  ICD-9-CM: 309.81     Reactive depression     ICD-10-CM: F32.9  ICD-9-CM: 300.4     Sleep apnea treated with continuous positive airway pressure (CPAP)     ICD-10-CM: G47.30  ICD-9-CM: 327.23             Referred to ENT, was hospitalized 7/31/23 for sialadenitis, treated with antibiotics, was advised phu follow up with Dr. Josephine Dickerson, but did not schedule . He may need biopsy or draining of the submandibular mass  HTN is not controlled, increase losartan, continue other medications  Referred to behavioral health  Continue CPAP use for CHELSY  Patient was encouraged to keep me informed of any acute changes, lack of improvement, or any new concerning symptoms.  Follow up in 2 weeks

## 2023-09-21 ENCOUNTER — OFFICE VISIT (OUTPATIENT)
Age: 48
End: 2023-09-21
Payer: COMMERCIAL

## 2023-09-21 VITALS
DIASTOLIC BLOOD PRESSURE: 88 MMHG | WEIGHT: 237.4 LBS | SYSTOLIC BLOOD PRESSURE: 138 MMHG | BODY MASS INDEX: 33.99 KG/M2 | OXYGEN SATURATION: 98 % | HEIGHT: 70 IN | HEART RATE: 70 BPM

## 2023-09-21 DIAGNOSIS — F43.23 ADJUSTMENT DISORDER WITH MIXED ANXIETY AND DEPRESSED MOOD: Primary | ICD-10-CM

## 2023-09-21 DIAGNOSIS — F51.05 INSOMNIA DUE TO OTHER MENTAL DISORDER: ICD-10-CM

## 2023-09-21 DIAGNOSIS — F99 INSOMNIA DUE TO OTHER MENTAL DISORDER: ICD-10-CM
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
## 2023-09-26 ENCOUNTER — OFFICE VISIT (OUTPATIENT)
Dept: FAMILY MEDICINE CLINIC | Facility: CLINIC | Age: 48
End: 2023-09-26
Payer: COMMERCIAL

## 2023-09-26 VITALS
HEART RATE: 80 BPM | OXYGEN SATURATION: 97 % | HEIGHT: 70 IN | DIASTOLIC BLOOD PRESSURE: 90 MMHG | BODY MASS INDEX: 34.22 KG/M2 | TEMPERATURE: 98.4 F | SYSTOLIC BLOOD PRESSURE: 144 MMHG | WEIGHT: 239 LBS

## 2023-09-26 DIAGNOSIS — R22.0 MASS OF RIGHT SUBMANDIBULAR REGION: ICD-10-CM

## 2023-09-26 DIAGNOSIS — Z00.00 HEALTHCARE MAINTENANCE: ICD-10-CM

## 2023-09-26 DIAGNOSIS — K11.20 SIALADENITIS: Primary | ICD-10-CM

## 2023-09-26 DIAGNOSIS — G47.30 SLEEP APNEA TREATED WITH CONTINUOUS POSITIVE AIRWAY PRESSURE (CPAP): ICD-10-CM

## 2023-09-26 DIAGNOSIS — E55.9 VITAMIN D DEFICIENCY: ICD-10-CM

## 2023-09-26 DIAGNOSIS — I10 PRIMARY HYPERTENSION: ICD-10-CM

## 2023-09-26 PROBLEM — E66.811 OBESITY (BMI 30.0-34.9): Status: ACTIVE | Noted: 2023-09-26

## 2023-09-26 PROBLEM — E66.9 OBESITY (BMI 30.0-34.9): Status: ACTIVE | Noted: 2023-09-26

## 2023-09-26 RX ORDER — HYDROCHLOROTHIAZIDE 25 MG/1
25 TABLET ORAL DAILY
Qty: 90 TABLET | Refills: 1 | Status: SHIPPED | OUTPATIENT
Start: 2023-09-26

## 2023-09-26 NOTE — PROGRESS NOTES
Subjective   Bandar Sanchez is a 47 y.o. male.   Chief Complaint   Patient presents with    Hypertension    Neck Pain       Hypertension  Associated symptoms include neck pain (related to submandibular mass). Pertinent negatives include no chest pain, headaches, palpitations or shortness of breath.   Neck Pain   Associated symptoms include trouble swallowing (pain). Pertinent negatives include no chest pain, fever or headaches.    Patient is here for HTN followup, states he just took his BP medication before he came in, does not check BP at home.   States he has not heard back about ENT referral for right submandibular mass; still has swelling and pain with certain foods such as bread, pizza, meats or things that require a lot of chewing.  Using CPAP now for CHELSY  The following portions of the patient's history were reviewed and updated as appropriate: allergies, current medications, past family history, past medical history, past social history, past surgical history, and problem list.    Review of Systems   Constitutional:  Positive for fatigue. Negative for chills and fever.   HENT:  Positive for ear pain (right), sore throat and trouble swallowing (pain). Negative for congestion.    Respiratory:  Positive for apnea. Negative for shortness of breath and wheezing.    Cardiovascular:  Negative for chest pain, palpitations and leg swelling.   Gastrointestinal:  Negative for nausea. Diarrhea: with abx.  Genitourinary:  Negative for difficulty urinating and dysuria.   Musculoskeletal:  Positive for neck pain (related to submandibular mass).   Skin:  Negative for color change, pallor, rash and wound.   Neurological:  Negative for dizziness, light-headedness and headaches.   Psychiatric/Behavioral:  Positive for dysphoric mood and sleep disturbance. Negative for self-injury and suicidal ideas. The patient is nervous/anxious.      Objective   Physical Exam  Vitals reviewed.   Constitutional:       General: He is not in  "acute distress.     Appearance: Normal appearance. He is not ill-appearing, toxic-appearing or diaphoretic.   HENT:      Head: Normocephalic and atraumatic.      Right Ear: Tympanic membrane normal.      Left Ear: Tympanic membrane normal.      Mouth/Throat:      Mouth: Mucous membranes are moist.      Pharynx: Posterior oropharyngeal erythema present. No oropharyngeal exudate.   Cardiovascular:      Rate and Rhythm: Normal rate and regular rhythm.   Pulmonary:      Effort: Pulmonary effort is normal. No respiratory distress.      Breath sounds: Normal breath sounds.   Lymphadenopathy:      Cervical: Cervical adenopathy (right firm submandibular golf sized mass) present.   Neurological:      Mental Status: He is alert and oriented to person, place, and time.   Psychiatric:         Behavior: Behavior normal.         Thought Content: Thought content normal.         Judgment: Judgment normal.     /90 (BP Location: Right arm, Patient Position: Sitting, Cuff Size: Large Adult)   Pulse 80   Temp 98.4 °F (36.9 °C) (Oral)   Ht 177.8 cm (70\")   Wt 108 kg (239 lb)   SpO2 97%   BMI 34.29 kg/m²     Assessment & Plan   Problems Addressed this Visit          Cardiac and Vasculature    Primary hypertension    Relevant Medications    hydroCHLOROthiazide (HYDRODIURIL) 25 MG tablet       ENT    Sialadenitis - Primary    Mass of right submandibular region       Other    Sleep apnea treated with continuous positive airway pressure (CPAP)     Other Visit Diagnoses       Healthcare maintenance        Relevant Orders    Comprehensive Metabolic Panel    CBC Auto Differential    Lipid Panel    Hepatitis C Antibody    TSH Rfx On Abnormal To Free T4    Vitamin D deficiency        Relevant Orders    Vitamin D,25-Hydroxy          Diagnoses         Codes Comments    Sialadenitis    -  Primary ICD-10-CM: K11.20  ICD-9-CM: 527.2     Primary hypertension     ICD-10-CM: I10  ICD-9-CM: 401.9     Mass of right submandibular region     " ICD-10-CM: R22.0  ICD-9-CM: 784.2     Healthcare maintenance     ICD-10-CM: Z00.00  ICD-9-CM: V70.0     Vitamin D deficiency     ICD-10-CM: E55.9  ICD-9-CM: 268.9     Sleep apnea treated with continuous positive airway pressure (CPAP)     ICD-10-CM: G47.30  ICD-9-CM: 327.23             Hypertension is not well controlled, increase HCTZ to 25  Patient provided name of ENT he was referred to Dr. Pieter Duke, patient will contact him to schedule an appointment for evaluation of right submandibular mass  Screening labs ordered to evaluate chronic conditions. I will contact patient regarding test results and provide instructions regarding any necessary changes in plan of care.  Patient wants to wait on colonoscopy  Continue CPAP use  Patient was encouraged to keep me informed of any acute changes, lack of improvement, or any new concerning symptoms.

## 2023-10-05 RX ORDER — FEBUXOSTAT 40 MG/1
TABLET, FILM COATED ORAL
Qty: 30 TABLET | Refills: 0 | OUTPATIENT
Start: 2023-10-05

## 2023-12-15 ENCOUNTER — OFFICE VISIT (OUTPATIENT)
Dept: FAMILY MEDICINE CLINIC | Facility: CLINIC | Age: 48
End: 2023-12-15
Payer: COMMERCIAL

## 2023-12-15 ENCOUNTER — LAB (OUTPATIENT)
Dept: LAB | Facility: HOSPITAL | Age: 48
End: 2023-12-15
Payer: COMMERCIAL

## 2023-12-15 VITALS
WEIGHT: 244.3 LBS | HEART RATE: 88 BPM | DIASTOLIC BLOOD PRESSURE: 88 MMHG | HEIGHT: 70 IN | OXYGEN SATURATION: 98 % | BODY MASS INDEX: 34.97 KG/M2 | SYSTOLIC BLOOD PRESSURE: 126 MMHG

## 2023-12-15 DIAGNOSIS — Z12.11 COLON CANCER SCREENING: ICD-10-CM

## 2023-12-15 DIAGNOSIS — G47.30 SLEEP APNEA TREATED WITH CONTINUOUS POSITIVE AIRWAY PRESSURE (CPAP): ICD-10-CM

## 2023-12-15 DIAGNOSIS — Z00.00 HEALTHCARE MAINTENANCE: ICD-10-CM

## 2023-12-15 DIAGNOSIS — Z00.00 ANNUAL PHYSICAL EXAM: Primary | ICD-10-CM

## 2023-12-15 DIAGNOSIS — F41.8 DEPRESSION WITH ANXIETY: ICD-10-CM

## 2023-12-15 DIAGNOSIS — K11.20 SIALADENITIS: ICD-10-CM

## 2023-12-15 DIAGNOSIS — E55.9 VITAMIN D DEFICIENCY: ICD-10-CM

## 2023-12-15 DIAGNOSIS — R22.0 MASS OF RIGHT SUBMANDIBULAR REGION: ICD-10-CM

## 2023-12-15 DIAGNOSIS — M10.9 GOUT OF BIG TOE: ICD-10-CM

## 2023-12-15 DIAGNOSIS — I10 PRIMARY HYPERTENSION: ICD-10-CM

## 2023-12-15 PROCEDURE — 80061 LIPID PANEL: CPT | Performed by: NURSE PRACTITIONER

## 2023-12-15 PROCEDURE — 82306 VITAMIN D 25 HYDROXY: CPT | Performed by: NURSE PRACTITIONER

## 2023-12-15 PROCEDURE — 80050 GENERAL HEALTH PANEL: CPT | Performed by: NURSE PRACTITIONER

## 2023-12-15 PROCEDURE — 84550 ASSAY OF BLOOD/URIC ACID: CPT | Performed by: NURSE PRACTITIONER

## 2023-12-15 PROCEDURE — 86803 HEPATITIS C AB TEST: CPT | Performed by: NURSE PRACTITIONER

## 2023-12-15 RX ORDER — COLCHICINE 0.6 MG/1
TABLET ORAL
Qty: 30 TABLET | Refills: 1 | Status: SHIPPED | OUTPATIENT
Start: 2023-12-15

## 2023-12-15 RX ORDER — TIZANIDINE 4 MG/1
TABLET ORAL
COMMUNITY
Start: 2023-10-03

## 2023-12-15 RX ORDER — PREDNISONE 20 MG/1
20 TABLET ORAL DAILY
Qty: 5 TABLET | Refills: 0 | Status: SHIPPED | OUTPATIENT
Start: 2023-12-15

## 2023-12-15 NOTE — PROGRESS NOTES
Patient Care Team:  Kanika June APRN as PCP - General (Nurse Practitioner)     Chief complaint: Patient is in today for a physical     Patient is a 48 y.o. male who presents for his yearly physical exam.     Hypertension  Associated symptoms include shortness of breath (occ with exertion). Pertinent negatives include no chest pain, headaches or palpitations.   Gout  Associated symptoms include arthralgias (right great toe) and fatigue. Pertinent negatives include no chest pain, chills, diaphoresis, fever, headaches, numbness or rash.      Patient is here for physical, saw [facility] once, did not follow up, appt was cancelled. Continues to have significant distress about his son who was arrested for murder of his girlfriend's 3 year old because he was driving the car and son tested positive for alcohol.   Did not hear from ENT, still having issues with right swollen salivary gland  New complaint of right great toe pain and swelling x 2 days, history of gout, has tried allopurinol in the past with no benefit  Review of Systems   Constitutional:  Positive for fatigue. Negative for chills, diaphoresis, fever and unexpected weight change.   Eyes:  Negative for visual disturbance.   Respiratory:  Positive for apnea (CPAP) and shortness of breath (occ with exertion). Negative for wheezing.    Cardiovascular:  Positive for leg swelling (ankles). Negative for chest pain and palpitations.   Gastrointestinal:  Negative for blood in stool, constipation and diarrhea.   Genitourinary:  Negative for difficulty urinating and dysuria.   Musculoskeletal:  Positive for arthralgias (right great toe) and gout. Negative for back pain.   Skin:  Negative for color change, pallor, rash and wound.   Neurological:  Negative for dizziness, light-headedness, numbness and headaches.   Psychiatric/Behavioral:  Positive for dysphoric mood and sleep disturbance. Negative for self-injury and suicidal ideas. The patient is  nervous/anxious.       History  Past Medical History:   Diagnosis Date    Acute gout involving toe of left foot     Hypertension     Tonsillitis       Past Surgical History:   Procedure Laterality Date    COLONOSCOPY        Allergies   Allergen Reactions    Lisinopril Anaphylaxis      Family History   Problem Relation Age of Onset    Cerebral aneurysm Mother 54    Breast cancer Maternal Grandmother     Cerebral aneurysm Maternal Aunt     Cerebral aneurysm Maternal Aunt     Cerebral aneurysm Maternal Aunt     Cerebral aneurysm Maternal Uncle      Social History     Socioeconomic History    Marital status:    Tobacco Use    Smoking status: Former     Packs/day: 1.00     Years: 16.00     Additional pack years: 0.00     Total pack years: 16.00     Types: Cigars, Cigarettes     Start date: 1999     Quit date:      Years since quittin.9     Passive exposure: Never    Smokeless tobacco: Never   Vaping Use    Vaping Use: Former    Substances: Nicotine, Flavoring    Devices: Disposable   Substance and Sexual Activity    Alcohol use: Not Currently     Alcohol/week: 2.0 standard drinks of alcohol     Types: 2 Cans of beer per week    Drug use: Not Currently     Types: Marijuana     Comment: occ    Sexual activity: Yes     Partners: Female        Current Outpatient Medications:     amLODIPine (NORVASC) 10 MG tablet, Take 1 tablet by mouth Daily., Disp: 30 tablet, Rfl: 1    CBD (cannabidiol) oral oil, Medical marijuana, Disp: , Rfl:     hydroCHLOROthiazide (HYDRODIURIL) 25 MG tablet, Take 1 tablet by mouth Daily., Disp: 90 tablet, Rfl: 1    losartan (COZAAR) 100 MG tablet, Take 1 tablet by mouth Daily., Disp: 90 tablet, Rfl: 1    metoprolol succinate XL (TOPROL-XL) 100 MG 24 hr tablet, Take 1 tablet by mouth Daily., Disp: , Rfl:     naloxone (NARCAN) 4 MG/0.1ML nasal spray, Call 911. Don't prime. [place] in 1 nostril for overdose. Repeat in 2-3 minutes in other nostril if no or minimal  "breathing/responsiveness., Disp: 2 each, Rfl: 0    tiZANidine (ZANAFLEX) 4 MG tablet, , Disp: , Rfl:     colchicine 0.6 MG tablet, Take 2 tablets at once, then 1 tablet in 1 hour, Disp: 30 tablet, Rfl: 1    predniSONE (DELTASONE) 20 MG tablet, Take 1 tablet by mouth Daily., Disp: 5 tablet, Rfl: 0    Immunizations   N/A   Prescribed/Refused   Date     Td/Tdap  (Booster Q 10 yrs)   []           Prescribed    []     Refused        []           Flu  (Yearly)   []        Prescribed    []     Refused        []           Pneumonia      []        Prescribed    []     Refused        []                 Hep B     []        Prescribed    []     Refused        []           Shingles  (Age 50 and older)   []        Prescribed    []     Refused        []           Immunization History   Administered Date(s) Administered    Hep B, Unspecified 02/28/1997    Hepatitis A 04/01/2020    Hepatitis B Adult/Adolescent IM 02/08/2018, 03/08/2018    Influenza, Unspecified 02/08/2018, 10/19/2018    MMR 03/08/2018, 05/11/2018    Tdap 02/08/2018     Health Maintenance   Topic Date Due    COLORECTAL CANCER SCREENING  Never done    COVID-19 Vaccine (1) Never done    ANNUAL PHYSICAL  Never done    INFLUENZA VACCINE  08/01/2023    BMI FOLLOWUP  09/26/2024    TDAP/TD VACCINES (2 - Td or Tdap) 02/08/2028    HEPATITIS C SCREENING  Completed    Pneumococcal Vaccine 0-64  Aged Out        Diabetes  [] Yes  [x] No   N/A      Date     Eye Exam     []             []   Complete     []   Recommended Date:  Where:       Foot Exam     []         []   Complete          Obesity Counseling     []       []   Complete     No results found for: \"HGBA1C\", \"MICROALBUR\"    Additional Testing      Date     Colorectal Screening       []   N/A   []   Complete    [x]   Ordered     Date:    Where:       Pap      []   N/A   []   Complete   []   OB/GYN Date:    Where:       Mammogram        []   N/A   []   Complete   []  OB/GYN   []   Ordered Date:    Where:     PSA  (Over age " 50)    [x]   N/A   []   Complete   []   Ordered Date:    Where:     US Aorta  (For male smokers, age 65)     []   N/A   []   Complete   []   Ordered Date:    Where:     CT for Smoker  (Age 55-75, 30 pk yr)    []   N/A   []   Complete   []   Ordered Date:    Where:     Bone Density/DEXA      []   N/A   []   Complete   []   Ordered Date:    Follow-up:     Hep. C        []   N/A   [x]   Complete   []   Ordered Date:    Where:     Results for orders placed or performed in visit on 12/15/23   Comprehensive Metabolic Panel    Specimen: Blood   Result Value Ref Range    Glucose 98 65 - 99 mg/dL    BUN 17 6 - 20 mg/dL    Creatinine 1.30 (H) 0.76 - 1.27 mg/dL    Sodium 139 136 - 145 mmol/L    Potassium 4.0 3.5 - 5.2 mmol/L    Chloride 101 98 - 107 mmol/L    CO2 25.6 22.0 - 29.0 mmol/L    Calcium 10.4 8.6 - 10.5 mg/dL    Total Protein 8.0 6.0 - 8.5 g/dL    Albumin 5.1 3.5 - 5.2 g/dL    ALT (SGPT) 20 1 - 41 U/L    AST (SGOT) 23 1 - 40 U/L    Alkaline Phosphatase 36 (L) 39 - 117 U/L    Total Bilirubin 0.5 0.0 - 1.2 mg/dL    Globulin 2.9 gm/dL    A/G Ratio 1.8 g/dL    BUN/Creatinine Ratio 13.1 7.0 - 25.0    Anion Gap 12.4 5.0 - 15.0 mmol/L    eGFR 68.2 >60.0 mL/min/1.73   CBC Auto Differential    Specimen: Blood   Result Value Ref Range    WBC 6.36 3.40 - 10.80 10*3/mm3    RBC 5.42 4.14 - 5.80 10*6/mm3    Hemoglobin 15.0 13.0 - 17.7 g/dL    Hematocrit 45.4 37.5 - 51.0 %    MCV 83.8 79.0 - 97.0 fL    MCH 27.7 26.6 - 33.0 pg    MCHC 33.0 31.5 - 35.7 g/dL    RDW 12.8 12.3 - 15.4 %    RDW-SD 38.4 37.0 - 54.0 fl    MPV 11.2 6.0 - 12.0 fL    Platelets 255 140 - 450 10*3/mm3    Neutrophil % 42.0 (L) 42.7 - 76.0 %    Lymphocyte % 45.8 (H) 19.6 - 45.3 %    Monocyte % 9.3 5.0 - 12.0 %    Eosinophil % 2.0 0.3 - 6.2 %    Basophil % 0.6 0.0 - 1.5 %    Immature Grans % 0.3 0.0 - 0.5 %    Neutrophils, Absolute 2.67 1.70 - 7.00 10*3/mm3    Lymphocytes, Absolute 2.91 0.70 - 3.10 10*3/mm3    Monocytes, Absolute 0.59 0.10 - 0.90 10*3/mm3     "Eosinophils, Absolute 0.13 0.00 - 0.40 10*3/mm3    Basophils, Absolute 0.04 0.00 - 0.20 10*3/mm3    Immature Grans, Absolute 0.02 0.00 - 0.05 10*3/mm3    nRBC 0.0 0.0 - 0.2 /100 WBC   Vitamin D,25-Hydroxy    Specimen: Blood   Result Value Ref Range    25 Hydroxy, Vitamin D 18.8 (L) 30.0 - 100.0 ng/ml   Lipid Panel    Specimen: Blood   Result Value Ref Range    Total Cholesterol 200 0 - 200 mg/dL    Triglycerides 263 (H) 0 - 150 mg/dL    HDL Cholesterol 36 (L) 40 - 60 mg/dL    LDL Cholesterol  118 (H) 0 - 100 mg/dL    VLDL Cholesterol 46 (H) 5 - 40 mg/dL    LDL/HDL Ratio 3.09    Hepatitis C Antibody    Specimen: Blood   Result Value Ref Range    Hepatitis C Ab Non-Reactive Non-Reactive   TSH Rfx On Abnormal To Free T4    Specimen: Blood   Result Value Ref Range    TSH 3.360 0.270 - 4.200 uIU/mL   Uric acid    Specimen: Blood   Result Value Ref Range    Uric Acid 6.8 3.4 - 7.0 mg/dL            /88 (BP Location: Right arm, Patient Position: Sitting, Cuff Size: Adult)   Pulse 88   Ht 177.8 cm (70\")   Wt 111 kg (244 lb 4.8 oz)   SpO2 98%   BMI 35.05 kg/m²       Physical Exam  Vitals and nursing note reviewed.   Constitutional:       General: He is not in acute distress.     Appearance: Normal appearance. He is well-developed. He is not diaphoretic.   HENT:      Head: Normocephalic and atraumatic.      Right Ear: External ear normal.      Left Ear: External ear normal.      Nose: Nose normal.   Eyes:      General: No scleral icterus.        Right eye: No discharge.         Left eye: No discharge.      Conjunctiva/sclera: Conjunctivae normal.      Pupils: Pupils are equal, round, and reactive to light.   Neck:      Thyroid: No thyromegaly.      Vascular: No JVD (no bruits).      Trachea: No tracheal deviation.     Cardiovascular:      Rate and Rhythm: Normal rate and regular rhythm.      Heart sounds: No murmur heard.     No friction rub. No gallop.   Pulmonary:      Effort: Pulmonary effort is normal. No " "respiratory distress.      Breath sounds: Normal breath sounds. No wheezing or rales.   Chest:      Chest wall: No tenderness.   Abdominal:      General: Bowel sounds are normal. There is no distension.      Palpations: Abdomen is soft. There is no mass.      Tenderness: There is no abdominal tenderness. There is no guarding or rebound.      Hernia: No hernia is present.   Genitourinary:     Comments: deferred  Musculoskeletal:         General: Tenderness (right lateral great toe) present. No deformity. Normal range of motion.      Cervical back: Normal range of motion and neck supple.      Right lower leg: No edema.      Left lower leg: No edema.   Lymphadenopathy:      Cervical: No cervical adenopathy.   Skin:     General: Skin is warm and dry.      Coloration: Skin is not pale.      Findings: No erythema or rash.   Neurological:      Mental Status: He is alert and oriented to person, place, and time.      Motor: No abnormal muscle tone.      Deep Tendon Reflexes: Reflexes are normal and symmetric. Reflexes normal.   Psychiatric:         Behavior: Behavior normal.         Thought Content: Thought content normal.         Judgment: Judgment normal.             Counseling provided on diet and nutrition, exercise, weight management, mental health concerns, and hypertension.  /88 (BP Location: Right arm, Patient Position: Sitting, Cuff Size: Adult)   Pulse 88   Ht 177.8 cm (70\")   Wt 111 kg (244 lb 4.8 oz)   SpO2 98%   BMI 35.05 kg/m²     Diagnoses and all orders for this visit:    Annual physical exam    Sleep apnea treated with continuous positive airway pressure (CPAP)    Sialadenitis    Primary hypertension    Gout of big toe  -     Uric acid; Future  -     colchicine 0.6 MG tablet; Take 2 tablets at once, then 1 tablet in 1 hour  -     predniSONE (DELTASONE) 20 MG tablet; Take 1 tablet by mouth Daily.  -     Uric acid    Colon cancer screening  -     Ambulatory Referral For Screening Colonoscopy    Mass " of right submandibular region    Healthcare maintenance  -     Comprehensive Metabolic Panel  -     CBC Auto Differential  -     Lipid Panel  -     Hepatitis C Antibody  -     TSH Rfx On Abnormal To Free T4    Vitamin D deficiency  -     Vitamin D,25-Hydroxy    Depression with anxiety    Other orders  -     tiZANidine (ZANAFLEX) 4 MG tablet     Screening labs ordered to evaluate chronic conditions. I will contact patient regarding test results and provide instructions regarding any necessary changes in plan of care.  Previously ordered, will have drawn today  Provided number to call to schedule with Dr. Galvan who he saw at [facility], continues to have mild swelling and pain right submandibular region  HTN is controlled, continue current medications  Try colchicine for gout, if not effective start prednisone tomorrow  Follow up with behavioral health, declines medication  Nutrition and activity goals reviewed including: mainly water to drink, limit white flour/processed sugar, and processed foods, choose fresh fruits, vegetables, fish, lean meats,high fiber carbs, exercise  working toward 150 mins cardio per week, weight training 2x/week.    Kanika TYRA June   12/16/2023   08:55 EST          Please note that portions of this document were completed with a voice recognition program.     At [facility], we believe that sharing information builds trust and better relationships. You are receiving this note because you are receiving care at [facility] or have recently visited. It is possible you will see health information before a provider has talked with you about it. This kind of information can be easy to misunderstand. To help you fully understand what it means for your health, we urge you to discuss this note with your provider.

## 2023-12-16 LAB
25(OH)D3 SERPL-MCNC: 18.8 NG/ML (ref 30–100)
ALBUMIN SERPL-MCNC: 5.1 G/DL (ref 3.5–5.2)
ALBUMIN/GLOB SERPL: 1.8 G/DL
ALP SERPL-CCNC: 36 U/L (ref 39–117)
ALT SERPL W P-5'-P-CCNC: 20 U/L (ref 1–41)
ANION GAP SERPL CALCULATED.3IONS-SCNC: 12.4 MMOL/L (ref 5–15)
AST SERPL-CCNC: 23 U/L (ref 1–40)
BASOPHILS # BLD AUTO: 0.04 10*3/MM3 (ref 0–0.2)
BASOPHILS NFR BLD AUTO: 0.6 % (ref 0–1.5)
BILIRUB SERPL-MCNC: 0.5 MG/DL (ref 0–1.2)
BUN SERPL-MCNC: 17 MG/DL (ref 6–20)
BUN/CREAT SERPL: 13.1 (ref 7–25)
CALCIUM SPEC-SCNC: 10.4 MG/DL (ref 8.6–10.5)
CHLORIDE SERPL-SCNC: 101 MMOL/L (ref 98–107)
CHOLEST SERPL-MCNC: 200 MG/DL (ref 0–200)
CO2 SERPL-SCNC: 25.6 MMOL/L (ref 22–29)
CREAT SERPL-MCNC: 1.3 MG/DL (ref 0.76–1.27)
DEPRECATED RDW RBC AUTO: 38.4 FL (ref 37–54)
EGFRCR SERPLBLD CKD-EPI 2021: 68.2 ML/MIN/1.73
EOSINOPHIL # BLD AUTO: 0.13 10*3/MM3 (ref 0–0.4)
EOSINOPHIL NFR BLD AUTO: 2 % (ref 0.3–6.2)
ERYTHROCYTE [DISTWIDTH] IN BLOOD BY AUTOMATED COUNT: 12.8 % (ref 12.3–15.4)
GLOBULIN UR ELPH-MCNC: 2.9 GM/DL
GLUCOSE SERPL-MCNC: 98 MG/DL (ref 65–99)
HCT VFR BLD AUTO: 45.4 % (ref 37.5–51)
HCV AB SER DONR QL: NORMAL
HDLC SERPL-MCNC: 36 MG/DL (ref 40–60)
HGB BLD-MCNC: 15 G/DL (ref 13–17.7)
IMM GRANULOCYTES # BLD AUTO: 0.02 10*3/MM3 (ref 0–0.05)
IMM GRANULOCYTES NFR BLD AUTO: 0.3 % (ref 0–0.5)
LDLC SERPL CALC-MCNC: 118 MG/DL (ref 0–100)
LDLC/HDLC SERPL: 3.09 {RATIO}
LYMPHOCYTES # BLD AUTO: 2.91 10*3/MM3 (ref 0.7–3.1)
LYMPHOCYTES NFR BLD AUTO: 45.8 % (ref 19.6–45.3)
MCH RBC QN AUTO: 27.7 PG (ref 26.6–33)
MCHC RBC AUTO-ENTMCNC: 33 G/DL (ref 31.5–35.7)
MCV RBC AUTO: 83.8 FL (ref 79–97)
MONOCYTES # BLD AUTO: 0.59 10*3/MM3 (ref 0.1–0.9)
MONOCYTES NFR BLD AUTO: 9.3 % (ref 5–12)
NEUTROPHILS NFR BLD AUTO: 2.67 10*3/MM3 (ref 1.7–7)
NEUTROPHILS NFR BLD AUTO: 42 % (ref 42.7–76)
NRBC BLD AUTO-RTO: 0 /100 WBC (ref 0–0.2)
PLATELET # BLD AUTO: 255 10*3/MM3 (ref 140–450)
PMV BLD AUTO: 11.2 FL (ref 6–12)
POTASSIUM SERPL-SCNC: 4 MMOL/L (ref 3.5–5.2)
PROT SERPL-MCNC: 8 G/DL (ref 6–8.5)
RBC # BLD AUTO: 5.42 10*6/MM3 (ref 4.14–5.8)
SODIUM SERPL-SCNC: 139 MMOL/L (ref 136–145)
TRIGL SERPL-MCNC: 263 MG/DL (ref 0–150)
TSH SERPL DL<=0.05 MIU/L-ACNC: 3.36 UIU/ML (ref 0.27–4.2)
URATE SERPL-MCNC: 6.8 MG/DL (ref 3.4–7)
VLDLC SERPL-MCNC: 46 MG/DL (ref 5–40)
WBC NRBC COR # BLD AUTO: 6.36 10*3/MM3 (ref 3.4–10.8)

## 2024-02-01 RX ORDER — SODIUM PICOSULFATE, MAGNESIUM OXIDE, AND ANHYDROUS CITRIC ACID 10; 3.5; 12 MG/160ML; G/160ML; G/160ML
350 LIQUID ORAL TAKE AS DIRECTED
Qty: 350 ML | Refills: 0 | Status: SHIPPED | OUTPATIENT
Start: 2024-02-01

## 2024-02-07 ENCOUNTER — PREP FOR SURGERY (OUTPATIENT)
Dept: OTHER | Facility: HOSPITAL | Age: 49
End: 2024-02-07
Payer: COMMERCIAL

## 2024-02-07 DIAGNOSIS — Z12.11 SCREEN FOR COLON CANCER: Primary | ICD-10-CM

## 2024-02-12 PROBLEM — Z12.11 SCREEN FOR COLON CANCER: Status: ACTIVE | Noted: 2024-02-07

## 2024-02-16 ENCOUNTER — PRE-ADMISSION TESTING (OUTPATIENT)
Dept: PREADMISSION TESTING | Facility: HOSPITAL | Age: 49
End: 2024-02-16
Payer: COMMERCIAL

## 2024-02-16 VITALS — BODY MASS INDEX: 34.32 KG/M2 | HEIGHT: 71 IN | WEIGHT: 245.15 LBS

## 2024-02-16 LAB
DEPRECATED RDW RBC AUTO: 38.7 FL (ref 37–54)
ERYTHROCYTE [DISTWIDTH] IN BLOOD BY AUTOMATED COUNT: 12.6 % (ref 12.3–15.4)
HCT VFR BLD AUTO: 44 % (ref 37.5–51)
HGB BLD-MCNC: 15 G/DL (ref 13–17.7)
MCH RBC QN AUTO: 28.9 PG (ref 26.6–33)
MCHC RBC AUTO-ENTMCNC: 34.1 G/DL (ref 31.5–35.7)
MCV RBC AUTO: 84.8 FL (ref 79–97)
PLATELET # BLD AUTO: 254 10*3/MM3 (ref 140–450)
PMV BLD AUTO: 10.5 FL (ref 6–12)
POTASSIUM SERPL-SCNC: 3.7 MMOL/L (ref 3.5–5.2)
QT INTERVAL: 396 MS
QTC INTERVAL: 408 MS
RBC # BLD AUTO: 5.19 10*6/MM3 (ref 4.14–5.8)
WBC NRBC COR # BLD AUTO: 6.04 10*3/MM3 (ref 3.4–10.8)

## 2024-02-16 PROCEDURE — 93010 ELECTROCARDIOGRAM REPORT: CPT | Performed by: INTERNAL MEDICINE

## 2024-02-16 PROCEDURE — 84132 ASSAY OF SERUM POTASSIUM: CPT

## 2024-02-16 PROCEDURE — 93005 ELECTROCARDIOGRAM TRACING: CPT

## 2024-02-16 PROCEDURE — 36415 COLL VENOUS BLD VENIPUNCTURE: CPT

## 2024-02-16 PROCEDURE — 85027 COMPLETE CBC AUTOMATED: CPT

## 2024-02-16 NOTE — PAT
An arrival time for procedure was not provided during PAT visit. If patient had any questions or concerns about their arrival time, they were instructed to contact their surgeon/physician.  Additionally, if the patient referred to an arrival time that was acquired from their my chart account, patient was encouraged to verify that time with their surgeon/physician. Arrival times are NOT provided in Pre Admission Testing Department.    Patient denies any current skin issues.     Per Anesthesia Request, patient instructed not to take their ACE/ARB medications on the AM of surgery.

## 2024-02-20 ENCOUNTER — ANESTHESIA EVENT (OUTPATIENT)
Dept: GASTROENTEROLOGY | Facility: HOSPITAL | Age: 49
End: 2024-02-20
Payer: COMMERCIAL

## 2024-02-20 ENCOUNTER — ANESTHESIA (OUTPATIENT)
Dept: GASTROENTEROLOGY | Facility: HOSPITAL | Age: 49
End: 2024-02-20
Payer: COMMERCIAL

## 2024-02-20 ENCOUNTER — HOSPITAL ENCOUNTER (OUTPATIENT)
Facility: HOSPITAL | Age: 49
Setting detail: HOSPITAL OUTPATIENT SURGERY
Discharge: HOME | End: 2024-02-20
Attending: INTERNAL MEDICINE | Admitting: INTERNAL MEDICINE
Payer: COMMERCIAL

## 2024-02-20 VITALS
SYSTOLIC BLOOD PRESSURE: 137 MMHG | RESPIRATION RATE: 18 BRPM | DIASTOLIC BLOOD PRESSURE: 107 MMHG | TEMPERATURE: 96.7 F | OXYGEN SATURATION: 95 % | HEART RATE: 76 BPM

## 2024-02-20 PROCEDURE — 25810000003 LACTATED RINGERS PER 1000 ML: Performed by: ANESTHESIOLOGY

## 2024-02-20 PROCEDURE — 45378 DIAGNOSTIC COLONOSCOPY: CPT | Performed by: INTERNAL MEDICINE

## 2024-02-20 PROCEDURE — S0260 H&P FOR SURGERY: HCPCS | Performed by: INTERNAL MEDICINE

## 2024-02-20 PROCEDURE — 25010000002 PROPOFOL 10 MG/ML EMULSION: Performed by: NURSE ANESTHETIST, CERTIFIED REGISTERED

## 2024-02-20 RX ORDER — FAMOTIDINE 20 MG/1
20 TABLET, FILM COATED ORAL ONCE
Status: CANCELLED | OUTPATIENT
Start: 2024-02-20 | End: 2024-02-20

## 2024-02-20 RX ORDER — SODIUM CHLORIDE 0.9 % (FLUSH) 0.9 %
10 SYRINGE (ML) INJECTION EVERY 12 HOURS SCHEDULED
Status: DISCONTINUED | OUTPATIENT
Start: 2024-02-20 | End: 2024-02-20 | Stop reason: HOSPADM

## 2024-02-20 RX ORDER — HYDROMORPHONE HYDROCHLORIDE 1 MG/ML
0.5 INJECTION, SOLUTION INTRAMUSCULAR; INTRAVENOUS; SUBCUTANEOUS
Status: CANCELLED | OUTPATIENT
Start: 2024-02-20

## 2024-02-20 RX ORDER — PROPOFOL 10 MG/ML
VIAL (ML) INTRAVENOUS AS NEEDED
Status: DISCONTINUED | OUTPATIENT
Start: 2024-02-20 | End: 2024-02-20 | Stop reason: SURG

## 2024-02-20 RX ORDER — SODIUM CHLORIDE 0.9 % (FLUSH) 0.9 %
10 SYRINGE (ML) INJECTION AS NEEDED
Status: DISCONTINUED | OUTPATIENT
Start: 2024-02-20 | End: 2024-02-20 | Stop reason: HOSPADM

## 2024-02-20 RX ORDER — FAMOTIDINE 10 MG/ML
20 INJECTION, SOLUTION INTRAVENOUS ONCE
Status: CANCELLED | OUTPATIENT
Start: 2024-02-20 | End: 2024-02-20

## 2024-02-20 RX ORDER — LIDOCAINE HYDROCHLORIDE 10 MG/ML
INJECTION, SOLUTION EPIDURAL; INFILTRATION; INTRACAUDAL; PERINEURAL AS NEEDED
Status: DISCONTINUED | OUTPATIENT
Start: 2024-02-20 | End: 2024-02-20 | Stop reason: SURG

## 2024-02-20 RX ORDER — FENTANYL CITRATE 50 UG/ML
50 INJECTION, SOLUTION INTRAMUSCULAR; INTRAVENOUS
Status: CANCELLED | OUTPATIENT
Start: 2024-02-20

## 2024-02-20 RX ORDER — MIDAZOLAM HYDROCHLORIDE 1 MG/ML
1 INJECTION INTRAMUSCULAR; INTRAVENOUS
Status: CANCELLED | OUTPATIENT
Start: 2024-02-20

## 2024-02-20 RX ORDER — LIDOCAINE HYDROCHLORIDE 10 MG/ML
0.5 INJECTION, SOLUTION EPIDURAL; INFILTRATION; INTRACAUDAL; PERINEURAL ONCE AS NEEDED
Status: DISCONTINUED | OUTPATIENT
Start: 2024-02-20 | End: 2024-02-20 | Stop reason: HOSPADM

## 2024-02-20 RX ORDER — SODIUM CHLORIDE, SODIUM LACTATE, POTASSIUM CHLORIDE, CALCIUM CHLORIDE 600; 310; 30; 20 MG/100ML; MG/100ML; MG/100ML; MG/100ML
9 INJECTION, SOLUTION INTRAVENOUS CONTINUOUS
Status: DISCONTINUED | OUTPATIENT
Start: 2024-02-20 | End: 2024-02-20 | Stop reason: HOSPADM

## 2024-02-20 RX ORDER — SODIUM CHLORIDE 9 MG/ML
40 INJECTION, SOLUTION INTRAVENOUS AS NEEDED
Status: DISCONTINUED | OUTPATIENT
Start: 2024-02-20 | End: 2024-02-20 | Stop reason: HOSPADM

## 2024-02-20 RX ADMIN — SODIUM CHLORIDE, POTASSIUM CHLORIDE, SODIUM LACTATE AND CALCIUM CHLORIDE: 600; 310; 30; 20 INJECTION, SOLUTION INTRAVENOUS at 14:01

## 2024-02-20 RX ADMIN — PROPOFOL 100 MG: 10 INJECTION, EMULSION INTRAVENOUS at 14:12

## 2024-02-20 RX ADMIN — PROPOFOL 100 MG: 10 INJECTION, EMULSION INTRAVENOUS at 14:07

## 2024-02-20 RX ADMIN — LIDOCAINE HYDROCHLORIDE 50 MG: 10 INJECTION, SOLUTION EPIDURAL; INFILTRATION; INTRACAUDAL; PERINEURAL at 14:07

## 2024-02-20 RX ADMIN — PROPOFOL 100 MCG/KG/MIN: 10 INJECTION, EMULSION INTRAVENOUS at 14:05

## 2024-02-20 RX ADMIN — PROPOFOL 100 MG: 10 INJECTION, EMULSION INTRAVENOUS at 14:10

## 2024-02-20 NOTE — ANESTHESIA POSTPROCEDURE EVALUATION
Patient: Bandar Sanchez    Procedure Summary       Date: 02/20/24 Room / Location:  [facility]    Anesthesia Start: 1401 Anesthesia Stop: 1437    Procedure: COLONOSCOPY Diagnosis:       Screen for colon cancer      (Screen for colon cancer [Z12.11])    Surgeons: Sara Chacko MD Provider: Milton James MD    Anesthesia Type: general ASA Status: 3            Anesthesia Type: general    Vitals  Vitals Value Taken Time   /80 02/20/24 1434   Temp 96.7 °F (35.9 °C) 02/20/24 1434   Pulse 77 02/20/24 1437   Resp 18 02/20/24 1434   SpO2 98 % 02/20/24 1437   Vitals shown include unfiled device data.        Post Anesthesia Care and Evaluation    Patient location during evaluation: PACU  Patient participation: complete - patient participated  Level of consciousness: awake and alert  Pain score: 0  Pain management: adequate    Airway patency: patent  Anesthetic complications: No anesthetic complications  PONV Status: none  Cardiovascular status: hemodynamically stable and acceptable  Respiratory status: nonlabored ventilation, acceptable, nasal cannula and spontaneous ventilation  Hydration status: acceptable

## 2024-02-20 NOTE — ANESTHESIA PREPROCEDURE EVALUATION
Anesthesia Evaluation     Patient summary reviewed and Nursing notes reviewed                Airway   Mallampati: II  TM distance: >3 FB  Neck ROM: full  No difficulty expected  Comment: BIG TONGUE  BEARD  Dental - normal exam     Pulmonary - normal exam   (+) a smoker Former,recent URI, sleep apnea  Cardiovascular - normal exam    (+) hypertension      Neuro/Psych  (+) psychiatric history PTSD  GI/Hepatic/Renal/Endo    (+) obesity    Musculoskeletal     Abdominal  - normal exam    Bowel sounds: normal.   Substance History   (+) drug use (DAILY MARIJUANA USE)     OB/GYN negative ob/gyn ROS         Other   arthritis,                 Anesthesia Plan    ASA 3     general     (PROPOFOL)  intravenous induction     Anesthetic plan, risks, benefits, and alternatives have been provided, discussed and informed consent has been obtained with: patient.    Plan discussed with CRNA.    CODE STATUS:

## 2024-02-20 NOTE — H&P
[facility]: Gastroenterology  HISTORY AND PHYSICAL    Patient Name: Bandar Sanchez  : 1975  MRN: [id number]  Primary Care Physician:  Kanika June APRN  Date of admission: 2024    Subjective   Subjective     Chief Complaint: screening colonoscopy; first colonoscopy    HPI:    Bandar Sanchez is a 48 y.o. male reports he feels clear but he had a donut this morning and reports ate 6 chicken nuggets yesterday at 11AM.  We discussed that risk for aspiration and his colonoscopy has been postponed until 1PM today.  We discussed that there is a chance that his colon will NOT be clear due to eating solids yesterday.  He reports his stools are clear yellow and wants to stay until 1PM and proceed with the procedure  He denies any GI complaints    Review of Systems   All systems were reviewed and negative except for: sore in neck area from his ENT inflammation.      Personal History     Past Medical History:   Diagnosis Date    Acute gout involving toe of left foot     Hypertension     Sleep apnea     uses CPAP    Tonsillitis        Past Surgical History:   Procedure Laterality Date    ENDOSCOPY      WISDOM TOOTH EXTRACTION         Family History: family history includes Breast cancer in his maternal grandmother; Cerebral aneurysm in his maternal aunt, maternal aunt, maternal aunt, and maternal uncle; Cerebral aneurysm (age of onset: 54) in his mother. Otherwise pertinent FHx was reviewed and not pertinent to current issue.    Social History:  reports that he quit smoking about 21 years ago. His smoking use included cigars and cigarettes. He started smoking about 25 years ago. He has a 16.00 pack-year smoking history. He has never been exposed to tobacco smoke. He has never used smokeless tobacco. He reports current alcohol use of about 2.0 standard drinks of alcohol per week. He reports current drug use. Frequency: 7.00 times per week. Drug: Marijuana.    Home  Medications:  CBD, Sod Picosulfate-Mag Ox-Cit Acd, amLODIPine, colchicine, hydroCHLOROthiazide, losartan, metoprolol succinate XL, and naloxone      Allergies:  Allergies   Allergen Reactions    Lisinopril Anaphylaxis       Objective   Objective     Vitals:       Wt Readings from Last 3 Encounters:   02/16/24 111 kg (245 lb 2.4 oz)   12/15/23 111 kg (244 lb 4.8 oz)   09/26/23 108 kg (239 lb)    body mass index is unknown because there is no height or weight on file.  Physical Exam    Physical Exam  Constitutional:       Appearance: He is obese. He is not ill-appearing.   HENT:      Head: Normocephalic.   Abdominal:      General: Abdomen is flat. Bowel sounds are normal.      Palpations: Abdomen is soft.   Neurological:      General: No focal deficit present.      Mental Status: He is alert and oriented to person, place, and time.   Psychiatric:         Mood and Affect: Mood normal.         Behavior: Behavior normal.           Result Review    Result Review:  I have personally reviewed the results from the time of this admission to 2/20/2024 10:37 EST and agree with these findings:  []  Laboratory  []  Microbiology  []  Radiology  []  EKG/Telemetry   []  Cardiology/Vascular   []  Pathology  [x]  Old records  []  Other:  Most notable findings include: ENT follow up pending but has been seen by them already    Assessment & Plan   Assessment / Plan     Brief Patient Summary:  Bandar Sanchez is a 48 y.o. male who here for screening colonoscopy but ate this morning.  His procedure will be postponed until 1 pm.  He reports he had chicken nuggets yesterday as well but he wants to proceed.  He is aware that if he is not clear he will need to repeat a colonoscopy sooner     Active Hospital Problems:  Active Hospital Problems    Diagnosis     **Screen for colon cancer      Plan:       DVT prophylaxis:  No DVT prophylaxis order currently exists.        CODE STATUS:       Admission Status:  I believe this patient meets  outpatient status.    Sara Chacko MD

## 2024-03-22 DIAGNOSIS — M10.9 GOUT OF BIG TOE: ICD-10-CM

## 2024-03-22 RX ORDER — COLCHICINE 0.6 MG/1
TABLET ORAL
Qty: 90 TABLET | Refills: 0 | Status: SHIPPED | OUTPATIENT
Start: 2024-03-22

## 2024-03-26 ENCOUNTER — PRIOR AUTHORIZATION (OUTPATIENT)
Dept: FAMILY MEDICINE CLINIC | Facility: CLINIC | Age: 49
End: 2024-03-26
Payer: COMMERCIAL

## 2024-03-26 DIAGNOSIS — M10.9 GOUT OF BIG TOE: ICD-10-CM

## 2024-03-26 NOTE — TELEPHONE ENCOUNTER
(Key: [id number]) - [id number]  Colchicine 0.6MG tablets  Status: PA Request  Created: March 26th, 2024  Sent: March 26th, 2024    Denied today  Denied. Unable to approve the medication (COLCHICINE Tablet 0.6MG) due to unmet criteria (1,2,3) as outlined in the plan guideline below. Plan guideline (CP.PMN.59 Quantity Limit Override and Dose Optimization) requires the following to be met prior to consideration of approval: Quantity Limit (QL) Exceptions (must meet all): 1. One of the following (a or b): a. Requested dose is supported by practice guidelines or peer-reviewed literature (e.g., phase 3 study or equivalent published in a reputable peer reviewed medical journal or text) for the relevant off-label use and/or regimen (prescriber must submit supporting evidence); b. Diagnosis of a rare condition/disease* for which Food and Drug Administration (FDA) dosing guidelines indicate a higher quantity (dose or frequency) than the currently set quantity limit (QL); *Example: Proton pump inhibitors, which are commonly used for gastroesophageal reflux disease, have a QL of one dose per day; however, when there is a rare diagnosis such as Zollinger-Tenorio syndrome, an override for two doses per day is allowed 2. Member has been titrated up from the lower dose with partial improvement without adverse reactions; 3. Dose optimization is required, unless one of the following applies (a or b): a. Dose titration: Multiple lower strength units are requested for the purpose of dose titration; b. Other clinical reasons: Medical justification supports inability to use the higher strength units to achieve the desired dose/regimen. Note: If you believe the member has met criteria (1,2,3) as described above, please resubmit your request with additional clinically supportive documentation for consideration.

## 2024-03-27 RX ORDER — COLCHICINE 0.6 MG/1
TABLET ORAL
Qty: 30 TABLET | Refills: 0 | Status: SHIPPED | OUTPATIENT
Start: 2024-03-27

## 2024-03-27 NOTE — TELEPHONE ENCOUNTER
Patient is on the medication for gout flare ups; original order was for 30 tablets, refill sent in by Medical Assistant, (AW) for 90; updated prescription and sent back in

## 2024-04-24 DIAGNOSIS — I10 PRIMARY HYPERTENSION: ICD-10-CM

## 2024-04-24 RX ORDER — LOSARTAN POTASSIUM 100 MG/1
100 TABLET ORAL
Qty: 90 TABLET | Refills: 0 | Status: SHIPPED | OUTPATIENT
Start: 2024-04-24

## 2024-05-14 DIAGNOSIS — I10 PRIMARY HYPERTENSION: ICD-10-CM

## 2024-05-14 RX ORDER — HYDROCHLOROTHIAZIDE 25 MG/1
25 TABLET ORAL DAILY
Qty: 90 TABLET | Refills: 0 | Status: SHIPPED | OUTPATIENT
Start: 2024-05-14

## 2024-07-10 DIAGNOSIS — I10 PRIMARY HYPERTENSION: ICD-10-CM

## 2024-07-10 RX ORDER — LOSARTAN POTASSIUM 100 MG/1
100 TABLET ORAL
Qty: 90 TABLET | Refills: 0 | Status: SHIPPED | OUTPATIENT
Start: 2024-07-10

## 2024-07-10 NOTE — TELEPHONE ENCOUNTER
Rx Refill Note  Requested Prescriptions     Pending Prescriptions Disp Refills    losartan (COZAAR) 100 MG tablet [Pharmacy Med Name: LOSARTAN POT 100MG] 90 tablet 0     Sig: TAKE 1 TABLET BY MOUTH DAILY.      Last office visit with prescribing clinician: 12/15/2023   Last telemedicine visit with prescribing clinician: Visit date not found   Next office visit with prescribing clinician: Visit date not found                         Would you like a call back once the refill request has been completed: [] Yes [] No    If the office needs to give you a call back, can they leave a voicemail: [] Yes [] No    Martha Mckinney MA  07/10/24, 11:02 EDT

## 2024-07-24 ENCOUNTER — HOSPITAL ENCOUNTER (OUTPATIENT)
Dept: GENERAL RADIOLOGY | Facility: HOSPITAL | Age: 49
Discharge: HOME | End: 2024-07-24
Payer: MEDICAID

## 2024-07-24 ENCOUNTER — OFFICE VISIT (OUTPATIENT)
Dept: FAMILY MEDICINE CLINIC | Facility: CLINIC | Age: 49
End: 2024-07-24
Payer: COMMERCIAL

## 2024-07-24 ENCOUNTER — TELEPHONE (OUTPATIENT)
Dept: FAMILY MEDICINE CLINIC | Facility: CLINIC | Age: 49
End: 2024-07-24

## 2024-07-24 VITALS
OXYGEN SATURATION: 95 % | SYSTOLIC BLOOD PRESSURE: 136 MMHG | DIASTOLIC BLOOD PRESSURE: 88 MMHG | BODY MASS INDEX: 33.63 KG/M2 | WEIGHT: 240.2 LBS | HEIGHT: 71 IN | HEART RATE: 70 BPM

## 2024-07-24 DIAGNOSIS — M54.41 ACUTE MIDLINE LOW BACK PAIN WITH RIGHT-SIDED SCIATICA: Primary | ICD-10-CM

## 2024-07-24 DIAGNOSIS — M54.41 ACUTE MIDLINE LOW BACK PAIN WITH RIGHT-SIDED SCIATICA: ICD-10-CM

## 2024-07-24 PROCEDURE — 99214 OFFICE O/P EST MOD 30 MIN: CPT

## 2024-07-24 PROCEDURE — 72100 X-RAY EXAM L-S SPINE 2/3 VWS: CPT

## 2024-07-24 RX ORDER — METHOCARBAMOL 500 MG/1
500 TABLET, FILM COATED ORAL 4 TIMES DAILY
Qty: 40 TABLET | Refills: 0 | Status: SHIPPED | OUTPATIENT
Start: 2024-07-24

## 2024-07-24 RX ORDER — METHYLPREDNISOLONE 4 MG/1
TABLET ORAL
Qty: 21 EACH | Refills: 0 | Status: SHIPPED | OUTPATIENT
Start: 2024-07-24

## 2024-07-24 NOTE — LETTER
July 24, 2024     Patient: Bandar Sanchez   YOB: 1975   Date of Visit: 7/24/2024       To Whom It May Concern:    Bandar Sanchez was seen in my office 7/24/2024. Please call with any acute concerns.          Sincerely,        Sonia Collado PA-C    CC: No Recipients

## 2024-07-24 NOTE — PROGRESS NOTES
"     New Patient Office Visit      Date: 2024   Patient Name: Bandar Sanchez  : 1975   MRN: [id number]     Chief Complaint:    Chief Complaint   Patient presents with    Back Pain     Lower area, throbbing       History of Present Illness: Bandar Sanchez is a 48 y.o. male who is here today to establish care.  Patient previously saw TYRA Alcala and is off boarding.  Patient has past medical history of hypertension, sleep apnea with CPAP and history of acute gout.  Patient presents today to discuss back pain.  Patient states that about 2 days ago he was carrying a table and ended up falling backwards and landing on his buttocks/low back.  Patient states he did not have any immediate pain then, but did still feel stiff the next day.  Patient states that this morning he woke up and had extreme lower back pain.  He states that it was difficult for him to get out of bed and he would felt like he had to lean over to walk.  He states he is having some pain that radiates from the right buttocks down the backside of his right leg that will come and go depending on the position he is then or if he is standing or walking for too long.  He denies loss of bowel or bladder control or saddle anesthesias.  He denies any loss of muscle movement.  Denies fevers, chills.  Patient states that \"curling up in the fetal position or leaning to the left feels better.\"  He states if he stays in a position too long he will get very stiff.  He states that he took 4 regular dosed ibuprofens this morning without any relief.  He states sometimes rubbing on the middle of his low back makes it feel better.          Subjective      Review of Systems:   Review of Systems   Constitutional:  Negative for chills and fever.   Respiratory:  Negative for chest tightness and shortness of breath.    Cardiovascular:  Negative for chest pain.   Genitourinary:  Negative for urinary incontinence.   Musculoskeletal:  Positive " for back pain and gait problem. Negative for neck pain.   Neurological:  Negative for headache.       Past Medical History:   Past Medical History:   Diagnosis Date    Acute gout involving toe of left foot     Hypertension     Sleep apnea     uses CPAP    Tonsillitis        Past Surgical History:   Past Surgical History:   Procedure Laterality Date    COLONOSCOPY N/A 2024    Procedure: COLONOSCOPY;  Surgeon: Sara Chacko MD;  Location: [facility] ENDOSCOPY;  Service: Gastroenterology;  Laterality: N/A;    ENDOSCOPY      WISDOM TOOTH EXTRACTION         Family History:   Family History   Problem Relation Age of Onset    Cerebral aneurysm Mother 54    Breast cancer Maternal Grandmother     Cerebral aneurysm Maternal Aunt     Cerebral aneurysm Maternal Aunt     Cerebral aneurysm Maternal Aunt     Cerebral aneurysm Maternal Uncle        Social History:   Social History     Socioeconomic History    Marital status:    Tobacco Use    Smoking status: Former     Current packs/day: 0.00     Average packs/day: 1 pack/day for 16.0 years (16.0 ttl pk-yrs)     Types: Cigars, Cigarettes     Start date: 1999     Quit date:      Years since quittin.5     Passive exposure: Never    Smokeless tobacco: Never   Vaping Use    Vaping status: Former    Substances: Nicotine, Flavoring    Devices: Disposable   Substance and Sexual Activity    Alcohol use: Yes     Alcohol/week: 2.0 standard drinks of alcohol     Types: 2 Cans of beer per week     Comment: 2-3x per week    Drug use: Yes     Frequency: 7.0 times per week     Types: Marijuana     Comment: medical marijuana per pt    Sexual activity: Defer       Medications:     Current Outpatient Medications:     amLODIPine (NORVASC) 10 MG tablet, Take 1 tablet by mouth Daily., Disp: 30 tablet, Rfl: 1    CBD (cannabidiol) oral oil, Daily. Medical marijuana, Disp: , Rfl:     colchicine 0.6 MG tablet, Take 2 tablets at once, then 1 tablet in 1 hour prn for gout flare ups,  "Disp: 30 tablet, Rfl: 0    hydroCHLOROthiazide 25 MG tablet, TAKE 1 TABLET BY MOUTH DAILY., Disp: 90 tablet, Rfl: 0    losartan (COZAAR) 100 MG tablet, TAKE 1 TABLET BY MOUTH DAILY., Disp: 90 tablet, Rfl: 0    metoprolol succinate XL (TOPROL-XL) 100 MG 24 hr tablet, Take 1 tablet by mouth Daily., Disp: , Rfl:     naloxone (NARCAN) 4 MG/0.1ML nasal spray, Call 911. Don't prime. [place] in 1 nostril for overdose. Repeat in 2-3 minutes in other nostril if no or minimal breathing/responsiveness., Disp: 2 each, Rfl: 0    Sod Picosulfate-Mag Ox-Cit Acd (Clenpiq) 10-3.5-12 MG-GM -GM/160ML solution, Take 350 mL by mouth Take As Directed., Disp: 350 mL, Rfl: 0    methocarbamol (ROBAXIN) 500 MG tablet, Take 1 tablet by mouth 4 (Four) Times a Day., Disp: 40 tablet, Rfl: 0    methylPREDNISolone (MEDROL) 4 MG dose pack, Take as directed on package instructions., Disp: 21 each, Rfl: 0    Allergies:   Allergies   Allergen Reactions    Lisinopril Anaphylaxis       Objective     Physical Exam:  Vital Signs:   Vitals:    07/24/24 1336   BP: 136/88   Pulse: 70   SpO2: 95%   Weight: 109 kg (240 lb 3.2 oz)   Height: 180.3 cm (71\")     Body mass index is 33.5 kg/m².         Physical Exam  Vitals reviewed.   Constitutional:       Appearance: Normal appearance.   HENT:      Head: Normocephalic and atraumatic.   Eyes:      Pupils: Pupils are equal, round, and reactive to light.   Cardiovascular:      Rate and Rhythm: Normal rate and regular rhythm.      Pulses: Normal pulses.      Heart sounds: Normal heart sounds.   Pulmonary:      Effort: Pulmonary effort is normal.      Breath sounds: Normal breath sounds.   Abdominal:      General: Abdomen is flat. Bowel sounds are normal.   Musculoskeletal:      Comments: TTP of low midline back.  No obvious deformity, bruising, swelling.   Skin:     General: Skin is warm.   Neurological:      General: No focal deficit present.      Mental Status: He is alert and oriented to person, place, and time.      " Deep Tendon Reflexes: Reflexes normal.   Psychiatric:         Mood and Affect: Mood normal.                     Assessment / Plan      Assessment/Plan:   Diagnoses and all orders for this visit:    1. Acute midline low back pain with right-sided sciatica (Primary)  -     XR Spine Lumbar 2 or 3 View; Future  -     methylPREDNISolone (MEDROL) 4 MG dose pack; Take as directed on package instructions.  Dispense: 21 each; Refill: 0  -     methocarbamol (ROBAXIN) 500 MG tablet; Take 1 tablet by mouth 4 (Four) Times a Day.  Dispense: 40 tablet; Refill: 0    Patient's x-ray does not show any acute abnormalities today.  It does show some mild degenerative changes, but no new traumas.  Would like patient to start on steroid pack to help with inflammation and pain.  Will also prescribe muscle relaxers today to help with muscle tension.  Discussed side effects of possible somnolence with the muscle relaxers and advised not to use heavy machinery or drive a vehicle while taking these.  Discussed with patient that I like to see him back in about 1 week with lack of improvement.  Encouraged him to stay mobile during this time, but cannot do any type of heavy lifting.  If you have new or worsening symptoms including loss of bowel or bladder control, fevers, numbness between the legs need to be evaluated in the ER urgently.  He verbalized understanding and agreed to this plan.    Follow Up:   Return in about 1 week (around 7/31/2024).    Sonia Collado PA-C   [facility] [place], [place] [facility]

## 2024-07-24 NOTE — TELEPHONE ENCOUNTER
Called to discuss results with patient.  He verbalized understanding.  Continue on current plan of supportive measures.  If pain continues into the next week patient to return to clinic for reevaluation.  Gave patient strict ER precautions.  He verbalized understanding and agreed to this plan.

## 2024-11-04 ENCOUNTER — OFFICE VISIT (OUTPATIENT)
Dept: FAMILY MEDICINE CLINIC | Facility: CLINIC | Age: 49
End: 2024-11-04
Payer: COMMERCIAL

## 2024-11-04 ENCOUNTER — LAB (OUTPATIENT)
Dept: LAB | Facility: HOSPITAL | Age: 49
End: 2024-11-04
Payer: COMMERCIAL

## 2024-11-04 VITALS
SYSTOLIC BLOOD PRESSURE: 136 MMHG | HEIGHT: 71 IN | HEART RATE: 90 BPM | OXYGEN SATURATION: 95 % | WEIGHT: 246 LBS | DIASTOLIC BLOOD PRESSURE: 102 MMHG | BODY MASS INDEX: 34.44 KG/M2 | TEMPERATURE: 98.2 F

## 2024-11-04 DIAGNOSIS — I10 PRIMARY HYPERTENSION: ICD-10-CM

## 2024-11-04 DIAGNOSIS — I10 PRIMARY HYPERTENSION: Primary | ICD-10-CM

## 2024-11-04 DIAGNOSIS — K11.20 SIALADENITIS: ICD-10-CM

## 2024-11-04 LAB
ALBUMIN SERPL-MCNC: 4.2 G/DL (ref 3.5–5.2)
ALBUMIN/GLOB SERPL: 1.2 G/DL
ALP SERPL-CCNC: 38 U/L (ref 39–117)
ALT SERPL W P-5'-P-CCNC: 18 U/L (ref 1–41)
ANION GAP SERPL CALCULATED.3IONS-SCNC: 10.2 MMOL/L (ref 5–15)
AST SERPL-CCNC: 17 U/L (ref 1–40)
BILIRUB SERPL-MCNC: 0.5 MG/DL (ref 0–1.2)
BUN SERPL-MCNC: 11 MG/DL (ref 6–20)
BUN/CREAT SERPL: 7.9 (ref 7–25)
CALCIUM SPEC-SCNC: 9.7 MG/DL (ref 8.6–10.5)
CHLORIDE SERPL-SCNC: 105 MMOL/L (ref 98–107)
CO2 SERPL-SCNC: 25.8 MMOL/L (ref 22–29)
CREAT SERPL-MCNC: 1.39 MG/DL (ref 0.76–1.27)
DEPRECATED RDW RBC AUTO: 40.9 FL (ref 37–54)
EGFRCR SERPLBLD CKD-EPI 2021: 62.5 ML/MIN/1.73
ERYTHROCYTE [DISTWIDTH] IN BLOOD BY AUTOMATED COUNT: 12.8 % (ref 12.3–15.4)
GLOBULIN UR ELPH-MCNC: 3.4 GM/DL
GLUCOSE SERPL-MCNC: 123 MG/DL (ref 65–99)
HBA1C MFR BLD: 6.1 % (ref 4.8–5.6)
HCT VFR BLD AUTO: 43.8 % (ref 37.5–51)
HGB BLD-MCNC: 14.2 G/DL (ref 13–17.7)
MCH RBC QN AUTO: 28.5 PG (ref 26.6–33)
MCHC RBC AUTO-ENTMCNC: 32.4 G/DL (ref 31.5–35.7)
MCV RBC AUTO: 88 FL (ref 79–97)
PLATELET # BLD AUTO: 211 10*3/MM3 (ref 140–450)
PMV BLD AUTO: 11.7 FL (ref 6–12)
POTASSIUM SERPL-SCNC: 4.4 MMOL/L (ref 3.5–5.2)
PROT SERPL-MCNC: 7.6 G/DL (ref 6–8.5)
RBC # BLD AUTO: 4.98 10*6/MM3 (ref 4.14–5.8)
SODIUM SERPL-SCNC: 141 MMOL/L (ref 136–145)
T4 FREE SERPL-MCNC: 1.04 NG/DL (ref 0.92–1.68)
TSH SERPL DL<=0.05 MIU/L-ACNC: 1.53 UIU/ML (ref 0.27–4.2)
WBC NRBC COR # BLD AUTO: 7.5 10*3/MM3 (ref 3.4–10.8)

## 2024-11-04 PROCEDURE — 99214 OFFICE O/P EST MOD 30 MIN: CPT

## 2024-11-04 PROCEDURE — 3075F SYST BP GE 130 - 139MM HG: CPT

## 2024-11-04 PROCEDURE — 84439 ASSAY OF FREE THYROXINE: CPT

## 2024-11-04 PROCEDURE — 1125F AMNT PAIN NOTED PAIN PRSNT: CPT

## 2024-11-04 PROCEDURE — 3080F DIAST BP >= 90 MM HG: CPT

## 2024-11-04 PROCEDURE — 83036 HEMOGLOBIN GLYCOSYLATED A1C: CPT

## 2024-11-04 PROCEDURE — 1160F RVW MEDS BY RX/DR IN RCRD: CPT

## 2024-11-04 PROCEDURE — 80050 GENERAL HEALTH PANEL: CPT

## 2024-11-04 PROCEDURE — 1159F MED LIST DOCD IN RCRD: CPT

## 2024-11-04 RX ORDER — HYDROCHLOROTHIAZIDE 25 MG/1
25 TABLET ORAL DAILY
Qty: 90 TABLET | Refills: 3 | Status: SHIPPED | OUTPATIENT
Start: 2024-11-04

## 2024-11-04 RX ORDER — METOPROLOL SUCCINATE 100 MG/1
100 TABLET, EXTENDED RELEASE ORAL DAILY
Qty: 90 TABLET | Refills: 3 | Status: SHIPPED | OUTPATIENT
Start: 2024-11-04

## 2024-11-04 RX ORDER — AMLODIPINE BESYLATE 10 MG/1
10 TABLET ORAL
Qty: 90 TABLET | Refills: 3 | Status: SHIPPED | OUTPATIENT
Start: 2024-11-04

## 2024-11-04 RX ORDER — LOSARTAN POTASSIUM 100 MG/1
100 TABLET ORAL
Qty: 90 TABLET | Refills: 3 | Status: SHIPPED | OUTPATIENT
Start: 2024-11-04

## 2024-11-04 NOTE — PROGRESS NOTES
"    Office Note     Name: Badnar Sanchez    : 1975     MRN: [id number]     Chief Complaint  Hypertension (Med refill )    Subjective     History of Present Illness:  Bandar Sanchez is a 49 y.o. male who presents today for hypertension.    Hypertension: Patient currently on amlodipine 10 mg and metoprolol 100 mg and losartan 100 mg and HCTZ 25 mg.  Has not been taking medications, had a lapse in insurance and was unable to get prescriptions.  Denies headache, visual changes, numbness or tingling, shortness of breath, chest pain, dizziness.  He states he does feel tired and can tell a difference from when he was taking his medications regularly.  He does not check his blood pressure regularly at home.    Chronic sialadenitis: Patient was hospitalized last year for episode of sialadenitis.  Patient states that he will get episodes where the salivary gland under his right jaw will swell up.  Patient states he did this yesterday and was difficult for him to swallow.  He states he almost went to the ER.  He states that has gone back down to its normal size now.  States it is not painful, denies fevers and chills.  He denies pain currently.  He states after he was hospitalized last year he was referred to an ENT for follow-up, but failed to follow-up with them.  He is interested in seeing them again due to recurrent issues.  He states he is not having a hard time breathing, difficulty swallowing or any choking like sensation.  Denies any warmth to touch to the area.  When in the hospital last year he did have a CT done which did show  \"1.Edematous and enlarged right submandibular gland with surrounding fat stranding. This finding suggest acute sialadenitis.  2.Severe palatine tonsillitis/adenitis causing severe airway narrowing.  3.Extensive soft tissue prominence at the base of tongue may represent enlarged and edematous lingual tonsil/tonsillitis. However, follow-up and direct visualization recommended " "to exclude base of tongue mass.  4.Neck lymphadenopathy, worse on the right.\"He states he tries to do sour candies, but this does not really help.  Denies feeling any drainage in his mouth.      Review of Systems:   Review of Systems   Constitutional:  Negative for chills and fever.   HENT:  Positive for swollen glands and trouble swallowing. Negative for congestion, mouth sores and sore throat.    Respiratory:  Positive for choking. Negative for cough, chest tightness, shortness of breath and stridor.    Cardiovascular:  Negative for chest pain and palpitations.   Gastrointestinal:  Negative for abdominal pain.   Neurological:  Negative for dizziness and light-headedness.       Past Medical History:   Past Medical History:   Diagnosis Date   • Acute gout involving toe of left foot    • Hypertension    • Sleep apnea     uses CPAP   • Tonsillitis        Past Surgical History:   Past Surgical History:   Procedure Laterality Date   • COLONOSCOPY N/A 2024    Procedure: COLONOSCOPY;  Surgeon: Sara Chacko MD;  Location: [facility] ENDOSCOPY;  Service: Gastroenterology;  Laterality: N/A;   • ENDOSCOPY     • WISDOM TOOTH EXTRACTION         Family History:   Family History   Problem Relation Age of Onset   • Cerebral aneurysm Mother 54   • Breast cancer Maternal Grandmother    • Cerebral aneurysm Maternal Aunt    • Cerebral aneurysm Maternal Aunt    • Cerebral aneurysm Maternal Aunt    • Cerebral aneurysm Maternal Uncle        Social History:   Social History     Socioeconomic History   • Marital status:    Tobacco Use   • Smoking status: Former     Current packs/day: 0.00     Average packs/day: 1 pack/day for 16.0 years (16.0 ttl pk-yrs)     Types: Cigars, Cigarettes     Start date: 1999     Quit date:      Years since quittin.8     Passive exposure: Never   • Smokeless tobacco: Never   Vaping Use   • Vaping status: Former   • Substances: Nicotine, Flavoring   • Devices: Disposable   Substance and " Sexual Activity   • Alcohol use: Yes     Alcohol/week: 2.0 standard drinks of alcohol     Types: 2 Cans of beer per week     Comment: 2-3x per week   • Drug use: Yes     Frequency: 7.0 times per week     Types: Marijuana     Comment: medical marijuana per pt   • Sexual activity: Defer       Immunizations:   Immunization History   Administered Date(s) Administered   • Hep B, Unspecified 02/28/1997   • Hepatitis A 04/01/2020   • Hepatitis B Adult/Adolescent IM 02/08/2018, 03/08/2018   • Influenza, Unspecified 02/08/2018, 10/19/2018   • MMR 03/08/2018, 05/11/2018   • Tdap 02/08/2018        Medications:     Current Outpatient Medications:   •  amLODIPine (NORVASC) 10 MG tablet, Take 1 tablet by mouth Daily., Disp: 90 tablet, Rfl: 3  •  CBD (cannabidiol) oral oil, Daily. Medical marijuana, Disp: , Rfl:   •  colchicine 0.6 MG tablet, Take 2 tablets at once, then 1 tablet in 1 hour prn for gout flare ups, Disp: 30 tablet, Rfl: 0  •  hydroCHLOROthiazide 25 MG tablet, Take 1 tablet by mouth Daily., Disp: 90 tablet, Rfl: 3  •  losartan (COZAAR) 100 MG tablet, Take 1 tablet by mouth Daily., Disp: 90 tablet, Rfl: 3  •  methocarbamol (ROBAXIN) 500 MG tablet, Take 1 tablet by mouth 4 (Four) Times a Day., Disp: 40 tablet, Rfl: 0  •  metoprolol succinate XL (TOPROL-XL) 100 MG 24 hr tablet, Take 1 tablet by mouth Daily., Disp: 90 tablet, Rfl: 3  •  naloxone (NARCAN) 4 MG/0.1ML nasal spray, Call 911. Don't prime. [place] in 1 nostril for overdose. Repeat in 2-3 minutes in other nostril if no or minimal breathing/responsiveness., Disp: 2 each, Rfl: 0  •  Sod Picosulfate-Mag Ox-Cit Acd (Clenpiq) 10-3.5-12 MG-GM -GM/160ML solution, Take 350 mL by mouth Take As Directed., Disp: 350 mL, Rfl: 0  •  amoxicillin-clavulanate (AUGMENTIN) 875-125 MG per tablet, Take 1 tablet by mouth 2 (Two) Times a Day for 10 days., Disp: 20 tablet, Rfl: 0    Allergies:   Allergies   Allergen Reactions   • Lisinopril Anaphylaxis       Objective     Vital  "Signs  BP (!) 136/102   Pulse 90   Temp 98.2 °F (36.8 °C)   Ht 180.3 cm (71\")   Wt 112 kg (246 lb)   SpO2 95%   BMI 34.31 kg/m²   Estimated body mass index is 34.31 kg/m² as calculated from the following:    Height as of this encounter: 180.3 cm (71\").    Weight as of this encounter: 112 kg (246 lb).           Physical Exam  Vitals reviewed.   Constitutional:       Appearance: Normal appearance.   HENT:      Head: Normocephalic and atraumatic.      Mouth/Throat:      Pharynx: Posterior oropharyngeal erythema present. No oropharyngeal exudate.      Tonsils: No tonsillar exudate or tonsillar abscesses.      Comments: Submandibular mass present.  Moderate in size.  Not tender to touch.  Eyes:      Pupils: Pupils are equal, round, and reactive to light.   Cardiovascular:      Rate and Rhythm: Normal rate and regular rhythm.      Pulses: Normal pulses.      Heart sounds: Normal heart sounds.   Pulmonary:      Effort: Pulmonary effort is normal.      Breath sounds: Normal breath sounds.   Skin:     General: Skin is warm.   Neurological:      General: No focal deficit present.      Mental Status: He is alert and oriented to person, place, and time.   Psychiatric:         Mood and Affect: Mood normal.          Results:  No results found for this or any previous visit (from the past 24 hours).     Assessment and Plan     Assessment/Plan:  Diagnoses and all orders for this visit:    1. Primary hypertension (Primary)  Assessment & Plan:  Hypertension is uncontrolled  Restart medications.  Prescriptions sent for metoprolol, losartan, hydrochlorothiazide, amlodipine.  Encouraged patient to notify me if he does not have refills.  Encourage low-sodium diet.  Encouraged monitoring blood pressure at home.  If consistently having readings greater than 130/80 patient to notify me.  If you begin having headaches, visual changes, chest pain, shortness of breath go to the ER.  Blood pressure will be reassessed in 1 week " .    Orders:  -     metoprolol succinate XL (TOPROL-XL) 100 MG 24 hr tablet; Take 1 tablet by mouth Daily.  Dispense: 90 tablet; Refill: 3  -     losartan (COZAAR) 100 MG tablet; Take 1 tablet by mouth Daily.  Dispense: 90 tablet; Refill: 3  -     hydroCHLOROthiazide 25 MG tablet; Take 1 tablet by mouth Daily.  Dispense: 90 tablet; Refill: 3  -     amLODIPine (NORVASC) 10 MG tablet; Take 1 tablet by mouth Daily.  Dispense: 90 tablet; Refill: 3  -     CBC (No Diff); Future  -     Comprehensive Metabolic Panel; Future  -     Hemoglobin A1c; Future  -     TSH; Future  -     T4, Free; Future    2. Sialadenitis  Assessment & Plan:  Chronic with flares.  Will will treat with Augmentin due to reports of swelling yesterday.  Hemodynamically stable and in no acute distress today.  With improvement today and no difficulty breathing or swallowing I do not think it is necessary to repeat imaging as this is a chronic condition for the patient.  Encourage sour candies and increasing hydration to increase secretions.  Will place urgent referral to ENT as patient was previously supposed to be evaluated for this over a year ago.  I did advise him that it is important to follow-up with specialists.  Will obtain basic laboratory evaluation today as well.  Advised patient that if he begins experiencing swelling again, difficulty breathing or swallowing, choking sensation, food feeling like it is getting stuck when he is eating or inability to keep water or food down needs to go to the emergency room for urgent evaluation.  He verbalized understanding and agreed to plan.    Orders:  -     Ambulatory Referral to ENT (Otolaryngology)  -     amoxicillin-clavulanate (AUGMENTIN) 875-125 MG per tablet; Take 1 tablet by mouth 2 (Two) Times a Day for 10 days.  Dispense: 20 tablet; Refill: 0        Follow Up  Return in about 1 week (around 11/11/2024) for Recheck.    Sonia Collado PA-C   [facility] [place] [facility]

## 2024-11-04 NOTE — ASSESSMENT & PLAN NOTE
Hypertension is uncontrolled  Restart medications.  Prescriptions sent for metoprolol, losartan, hydrochlorothiazide, amlodipine.  Encouraged patient to notify me if he does not have refills.  Encourage low-sodium diet.  Encouraged monitoring blood pressure at home.  If consistently having readings greater than 130/80 patient to notify me.  If you begin having headaches, visual changes, chest pain, shortness of breath go to the ER.  Blood pressure will be reassessed in 1 week .

## 2024-11-04 NOTE — PATIENT INSTRUCTIONS
Exercising to Lose Weight  Getting regular exercise is important for everyone. It is especially important if you are overweight. Being overweight increases your risk of heart disease, stroke, diabetes, high blood pressure, and several types of cancer. Exercising, and reducing the calories you consume, can help you lose weight and improve fitness and health.  Exercise can be moderate or vigorous intensity. To lose weight, most people need to do a certain amount of moderate or vigorous-intensity exercise each week.  How can exercise affect me?  You lose weight when you exercise enough to burn more calories than you eat. Exercise also reduces body fat and builds muscle. The more muscle you have, the more calories you burn. Exercise also:  Improves mood.  Reduces stress and tension.  Improves your overall fitness, flexibility, and endurance.  Increases bone strength.  Moderate-intensity exercise    Moderate-intensity exercise is any activity that gets you moving enough to burn at least three times more energy (calories) than if you were sitting.  Examples of moderate exercise include:  Walking a mile in 15 minutes.  Doing light yard work.  Biking at an easy pace.  Most people should get at least 150 minutes of moderate-intensity exercise a week to maintain their body weight.  Vigorous-intensity exercise  Vigorous-intensity exercise is any activity that gets you moving enough to burn at least six times more calories than if you were sitting. When you exercise at this intensity, you should be working hard enough that you are not able to carry on a conversation.  Examples of vigorous exercise include:  Running.  Playing a team sport, such as football, basketball, and soccer.  Jumping rope.  Most people should get at least 75 minutes a week of vigorous exercise to maintain their body weight.  What actions can I take to lose weight?  The amount of exercise you need to lose weight depends on:  Your age.  The type of  exercise.  Any health conditions you have.  Your overall physical ability.  Talk to your health care provider about how much exercise you need and what types of activities are safe for you.  Nutrition    Make changes to your diet as told by your health care provider or diet and nutrition specialist (dietitian). This may include:  Eating fewer calories.  Eating more protein.  Eating less unhealthy fats.  Eating a diet that includes fresh fruits and vegetables, whole grains, low-fat dairy products, and lean protein.  Avoiding foods with added fat, salt, and sugar.  Drink plenty of water while you exercise to prevent dehydration or heat stroke.  Activity  Choose an activity that you enjoy and set realistic goals. Your health care provider can help you make an exercise plan that works for you.  Exercise at a moderate or vigorous intensity most days of the week.  The intensity of exercise may vary from person to person. You can tell how intense a workout is for you by paying attention to your breathing and heartbeat. Most people will notice their breathing and heartbeat get faster with more intense exercise.  Do resistance training twice each week, such as:  Push-ups.  Sit-ups.  Lifting weights.  Using resistance bands.  Getting short amounts of exercise can be just as helpful as long, structured periods of exercise. If you have trouble finding time to exercise, try doing these things as part of your daily routine:  Get up, stretch, and walk around every 30 minutes throughout the day.  Go for a walk during your lunch break.  Park your car farther away from your destination.  If you take public transportation, get off one stop early and walk the rest of the way.  Make phone calls while standing up and walking around.  Take the stairs instead of elevators or escalators.  Wear comfortable clothes and shoes with good support.  Do not exercise so much that you hurt yourself, feel dizzy, or get very short of breath.  Where to  find more information  [place] Department of Health and Human Services: www.hhs.gov  Centers for Disease Control and Prevention: www.cdc.gov  Contact a health care provider:  Before starting a new exercise program.  If you have questions or concerns about your weight.  If you have a medical problem that keeps you from exercising.  Get help right away if:  You have any of the following while exercising:  Injury.  Dizziness.  Difficulty breathing or shortness of breath that does not go away when you stop exercising.  Chest pain.  Rapid heartbeat.  These symptoms may represent a serious problem that is an emergency. Do not wait to see if the symptoms will go away. Get medical help right away. Call your local emergency services (911 in the [place] Do not drive yourself to the hospital.  Summary  Getting regular exercise is especially important if you are overweight.  Being overweight increases your risk of heart disease, stroke, diabetes, high blood pressure, and several types of cancer.  Losing weight happens when you burn more calories than you eat.  Reducing the amount of calories you eat, and getting regular moderate or vigorous exercise each week, helps you lose weight.  This information is not intended to replace advice given to you by your health care provider. Make sure you discuss any questions you have with your health care provider.  Document Revised: 02/13/2022 Document Reviewed: 02/13/2022  [employer] Patient Education © 2024 [employer]    MyPlate from [employer]    MyPlate is an outline of a general healthy diet based on the Dietary Guidelines for Americans, 8263-4446, from the U.[place]. Department of Agriculture (USDA). It sets guidelines for how much food you should eat from each food group based on your age, sex, and level of physical activity.  What are tips for following MyPlate?  To follow MyPlate recommendations:  Eat a wide variety of fruits and vegetables, grains, and protein foods.  Serve smaller portions and  eat less food throughout the day.  Limit portion sizes to avoid overeating.  Enjoy your food.  Get at least 150 minutes of exercise every week. This is about 30 minutes each day, 5 or more days per week.  It can be difficult to have every meal look like MyPlate. Think about MyPlate as eating guidelines for an entire day, rather than each individual meal.  Fruits and vegetables  Make one half of your plate fruits and vegetables.  Eat many different colors of fruits and vegetables each day.  For a 2,000-calorie daily food plan, eat:  2½ cups of vegetables every day.  2 cups of fruit every day.  1 cup is equal to:  1 cup raw or cooked vegetables.  1 cup raw fruit.  1 medium-sized orange, apple, or banana.  1 cup 100% fruit or vegetable juice.  2 cups raw leafy greens, such as lettuce, spinach, or kale.  ½ cup dried fruit.  Grains  One fourth of your plate should be grains.  Make at least half of the grains you eat each day whole grains.  For a 2,000-calorie daily food plan, eat 6 oz of grains every day.  1 oz is equal to:  1 slice bread.  1 cup cereal.  ½ cup cooked rice, cereal, or pasta.  Protein  One fourth of your plate should be protein.  Eat a wide variety of protein foods, including meat, poultry, fish, eggs, beans, nuts, and tofu.  For a 2,000-calorie daily food plan, eat 5½ oz of protein every day.  1 oz is equal to:  1 oz meat, poultry, or fish.  ¼ cup cooked beans.  1 egg.  ½ oz nuts or seeds.  1 Tbsp peanut butter.  Dairy  Drink fat-free or low-fat (1%) milk.  Eat or drink dairy as a side to meals.  For a 2,000-calorie daily food plan, eat or drink 3 cups of dairy every day.  1 cup is equal to:  1 cup milk, yogurt, cottage cheese, or soy milk (soy beverage).  2 oz processed cheese.  1½ oz natural cheese.  Fats, oils, salt, and sugars  Only small amounts of oils are recommended.  Avoid foods that are high in calories and low in nutritional value (empty calories), like foods high in fat or added  sugars.  Choose foods that are low in salt (sodium). Choose foods that have less than 140 milligrams (mg) of sodium per serving.  Drink water instead of sugary drinks. Drink enough fluid to keep your urine pale yellow.  Where to find support  Work with your health care provider or a dietitian to develop a customized eating plan that is right for you.  Download an jan (mobile application) to help you track your daily food intake.  Where to find more information  USDA: ChooseMyPlate.gov  Summary  MyPlate is a general guideline for healthy eating from the USDA. It is based on the Dietary Guidelines for Americans, 6739-2230.  In general, fruits and vegetables should take up one half of your plate, grains should take up one fourth of your plate, and protein should take up one fourth of your plate.  This information is not intended to replace advice given to you by your health care provider. Make sure you discuss any questions you have with your health care provider.  Document Revised: 11/08/2021 Document Reviewed: 11/08/2021  [employer] Patient Education © 2024 [employer]

## 2024-11-04 NOTE — ASSESSMENT & PLAN NOTE
Chronic with flares.  Will will treat with Augmentin due to reports of swelling yesterday.  Hemodynamically stable and in no acute distress today.  With improvement today and no difficulty breathing or swallowing I do not think it is necessary to repeat imaging as this is a chronic condition for the patient.  Encourage sour candies and increasing hydration to increase secretions.  Will place urgent referral to ENT as patient was previously supposed to be evaluated for this over a year ago.  I did advise him that it is important to follow-up with specialists.  Will obtain basic laboratory evaluation today as well.  Advised patient that if he begins experiencing swelling again, difficulty breathing or swallowing, choking sensation, food feeling like it is getting stuck when he is eating or inability to keep water or food down needs to go to the emergency room for urgent evaluation.  He verbalized understanding and agreed to plan.

## 2024-11-05 ENCOUNTER — TELEPHONE (OUTPATIENT)
Dept: FAMILY MEDICINE CLINIC | Facility: CLINIC | Age: 49
End: 2024-11-05
Payer: COMMERCIAL

## 2024-11-05 NOTE — TELEPHONE ENCOUNTER
----- Message from Sonia Collado sent at 11/5/2024  8:13 AM EST -----  Blood work reveals mild dehydration.  Recommend increasing water intake.    Your A1c is also slightly elevated indicating that you are prediabetic.  It is 6.1%.  Once it gets greater than or equal to 6.5% then we will recommend starting treatment for diabetes.  Recommend diet and exercise modifications.Nutrition and activity goals reviewed including: mainly water to drink, limit white flour/processed sugar, and processed foods, choose fresh fruits, vegetables, fish, lean meats,high fiber carbs, exercise  working toward 150 mins cardio per week, weight training 2x/week.  Recommend ADA diet and the Mediterranean diet.  Will monitor this.    Your blood counts are within normal limits.  Not significantly elevated indicating infection.  Would like you to continue on the Augmentin.  Will hold on imaging for now.  The ENT should be reaching out soon.  If you do not hear from them please let us know.  Still insurance the ER precautions we discussed yesterday in office.    Unable to reach patient.    Please relay.

## 2024-11-06 NOTE — TELEPHONE ENCOUNTER
SABINA reviewed. Discussed labs with patients wife. Informed her of recommendations for dieting and exercising and increasing water intake. Advised her that if the ENT office does not reach out over a week or two to let the office know. No further questions or concerns at this time.

## 2024-12-17 ENCOUNTER — LAB (OUTPATIENT)
Dept: LAB | Facility: HOSPITAL | Age: 49
End: 2024-12-17
Payer: COMMERCIAL

## 2024-12-17 ENCOUNTER — OFFICE VISIT (OUTPATIENT)
Dept: FAMILY MEDICINE CLINIC | Facility: CLINIC | Age: 49
End: 2024-12-17
Payer: COMMERCIAL

## 2024-12-17 VITALS
WEIGHT: 243.4 LBS | HEIGHT: 71 IN | DIASTOLIC BLOOD PRESSURE: 90 MMHG | BODY MASS INDEX: 34.07 KG/M2 | OXYGEN SATURATION: 95 % | SYSTOLIC BLOOD PRESSURE: 128 MMHG | TEMPERATURE: 98.2 F | HEART RATE: 86 BPM

## 2024-12-17 DIAGNOSIS — R73.03 PREDIABETES: ICD-10-CM

## 2024-12-17 DIAGNOSIS — K11.20 SIALADENITIS: ICD-10-CM

## 2024-12-17 DIAGNOSIS — Z00.00 ANNUAL PHYSICAL EXAM: Primary | ICD-10-CM

## 2024-12-17 DIAGNOSIS — Z81.4: ICD-10-CM

## 2024-12-17 DIAGNOSIS — E78.5 DYSLIPIDEMIA: Primary | ICD-10-CM

## 2024-12-17 DIAGNOSIS — E78.5 DYSLIPIDEMIA: ICD-10-CM

## 2024-12-17 DIAGNOSIS — I10 PRIMARY HYPERTENSION: ICD-10-CM

## 2024-12-17 DIAGNOSIS — R22.0 MASS OF RIGHT SUBMANDIBULAR REGION: ICD-10-CM

## 2024-12-17 PROBLEM — Z12.11 SCREEN FOR COLON CANCER: Status: RESOLVED | Noted: 2024-02-07 | Resolved: 2024-12-17

## 2024-12-17 LAB
ALBUMIN SERPL-MCNC: 4.7 G/DL (ref 3.5–5.2)
ALBUMIN/GLOB SERPL: 1.6 G/DL
ALP SERPL-CCNC: 38 U/L (ref 39–117)
ALT SERPL W P-5'-P-CCNC: 22 U/L (ref 1–41)
ANION GAP SERPL CALCULATED.3IONS-SCNC: 11 MMOL/L (ref 5–15)
AST SERPL-CCNC: 25 U/L (ref 1–40)
BILIRUB SERPL-MCNC: 0.6 MG/DL (ref 0–1.2)
BUN SERPL-MCNC: 14 MG/DL (ref 6–20)
BUN/CREAT SERPL: 11.5 (ref 7–25)
CALCIUM SPEC-SCNC: 9.8 MG/DL (ref 8.6–10.5)
CHLORIDE SERPL-SCNC: 100 MMOL/L (ref 98–107)
CHOLEST SERPL-MCNC: 178 MG/DL (ref 0–200)
CO2 SERPL-SCNC: 26 MMOL/L (ref 22–29)
CREAT SERPL-MCNC: 1.22 MG/DL (ref 0.76–1.27)
EGFRCR SERPLBLD CKD-EPI 2021: 73.1 ML/MIN/1.73
GLOBULIN UR ELPH-MCNC: 3 GM/DL
GLUCOSE SERPL-MCNC: 134 MG/DL (ref 65–99)
HDLC SERPL-MCNC: 38 MG/DL (ref 40–60)
LDLC SERPL CALC-MCNC: 111 MG/DL (ref 0–100)
LDLC/HDLC SERPL: 2.84 {RATIO}
POTASSIUM SERPL-SCNC: 4.1 MMOL/L (ref 3.5–5.2)
PROT SERPL-MCNC: 7.7 G/DL (ref 6–8.5)
SODIUM SERPL-SCNC: 137 MMOL/L (ref 136–145)
TRIGL SERPL-MCNC: 161 MG/DL (ref 0–150)
VLDLC SERPL-MCNC: 29 MG/DL (ref 5–40)

## 2024-12-17 PROCEDURE — 3080F DIAST BP >= 90 MM HG: CPT

## 2024-12-17 PROCEDURE — 1159F MED LIST DOCD IN RCRD: CPT

## 2024-12-17 PROCEDURE — 99396 PREV VISIT EST AGE 40-64: CPT

## 2024-12-17 PROCEDURE — 3074F SYST BP LT 130 MM HG: CPT

## 2024-12-17 PROCEDURE — 80061 LIPID PANEL: CPT

## 2024-12-17 PROCEDURE — 1125F AMNT PAIN NOTED PAIN PRSNT: CPT

## 2024-12-17 PROCEDURE — 1160F RVW MEDS BY RX/DR IN RCRD: CPT

## 2024-12-17 PROCEDURE — 80053 COMPREHEN METABOLIC PANEL: CPT

## 2024-12-17 NOTE — ASSESSMENT & PLAN NOTE
Hypertension is borderline  Dietary sodium restriction.  Weight loss.  Regular aerobic exercise.  Stop smoking.  Ambulatory blood pressure monitoring.  Increase HCTZ to 1.5 mg. Monitor BP at home/local pharmacy for next 1 week. If BP running >130/80 persistently patient to notify me.   Blood pressure will be reassessedin 3 months.

## 2024-12-17 NOTE — PROGRESS NOTES
"     Male Physical Note      Date: 2024   Patient Name: Bandar Sanchez  : 1975   MRN: [id number]     Chief Complaint:    Chief Complaint   Patient presents with    Annual Exam    Neck Pain     About a year    Med Refill     Would like to have narcan on hand if possible       History of Present Illness: Bandar Sanchez is a 49 y.o. male who is here today for their annual health maintenance and physical.  Past medical history includes hypertension,, chronic sial adenitis, PTSD and sleep apnea.    HTN: Taking Metoprolol, Losartan, HCTZ and Amlodipine.  Does not check regularly at home.  Denies chest pain, shortness of breath, palpitations, dizziness or visual changes.  Blood pressure borderline today.  States he would be able to stop at the pharmacy to check blood pressure.    Sleep apnea: Uses CPAP.    Prediabetic: Wants to lose weight and work on diet. Interested in referral to nutritionist to discuss further.       Chronic sialadenitis: Patient was hospitalized last year for episode of sialadenitis.  Patient states that he will get episodes where the salivary gland under his right jaw will swell up.  He states it still noticeable.  He states when he eats it will hurt and then get better.  He states his mouth is sometimes dry.  He states he is not having a hard time breathing, difficulty swallowing or any choking like sensation.  Denies any warmth to touch to the area.  When in the hospital last year he did have a CT done which did show  \"1.Edematous and enlarged right submandibular gland with surrounding fat stranding. This finding suggest acute sialadenitis.  2.Severe palatine tonsillitis/adenitis causing severe airway narrowing.  3.Extensive soft tissue prominence at the base of tongue may represent enlarged and edematous lingual tonsil/tonsillitis. However, follow-up and direct visualization recommended to exclude base of tongue mass.  4.Neck lymphadenopathy, worse on the right.\" He states " he tries to do sour candies, but this does not really help.  Denies feeling any drainage in his mouth.  Has not heard from ENT.  Denies any discharge in his mouth.  Denies fevers, chills, body aches, neck pain currently.  He states when he does eat it does cause him a lot of pain and will swell some.  States it has stayed about the same size since last year.  Recently completed course of Augmentin which she thinks helped some when it was recently inflamed.  Denies fevers, night sweats, unexpected weight loss.     Patient is referred questing to a prescription for Narcan.  He states that his sons children's mothers current being with them.  He states that she is a previous drug user and is unsure whether she is still using though she is currently pregnant.  He states he is not using any illegal substances.  He states he would like to have Narcan on hand in case anything were to happen to any family members.  Has never used before.    Up-to-date on colonoscopy.  Up-to-date on tetanus booster.        Subjective      Review of Systems:   Review of Systems   Constitutional:  Negative for chills, diaphoresis, fever and unexpected weight loss.   Respiratory:  Negative for chest tightness and shortness of breath.    Cardiovascular:  Negative for chest pain and palpitations.   Gastrointestinal:  Negative for abdominal pain.   Musculoskeletal:  Positive for neck pain.   Neurological:  Negative for dizziness, light-headedness, numbness and headache.       Past Medical History, Social History, Family History and Care Team were all reviewed with patient and updated as appropriate.     Medications:     Current Outpatient Medications:     amLODIPine (NORVASC) 10 MG tablet, Take 1 tablet by mouth Daily., Disp: 90 tablet, Rfl: 3    hydroCHLOROthiazide 25 MG tablet, Take 1 tablet by mouth Daily., Disp: 90 tablet, Rfl: 3    losartan (COZAAR) 100 MG tablet, Take 1 tablet by mouth Daily., Disp: 90 tablet, Rfl: 3    metoprolol succinate  XL (TOPROL-XL) 100 MG 24 hr tablet, Take 1 tablet by mouth Daily., Disp: 90 tablet, Rfl: 3    naloxone (NARCAN) 4 MG/0.1ML nasal spray, Call 911. Don't prime. [place] in 1 nostril for overdose. Repeat in 2-3 minutes in other nostril if no or minimal breathing/responsiveness., Disp: 2 each, Rfl: 2    Allergies:   Allergies   Allergen Reactions    Lisinopril Anaphylaxis       Immunizations:  Td/Tdap(Booster Q 10 yrs):  UTD until .   Flu (Yearly):  Declined   COVID 19: Declined   Colorectal Screening:   Completed in 2024. Recommended f/u colonoscopy in 7 years.   Last Completed Colonoscopy            COLORECTAL CANCER SCREENING (COLONOSCOPY - Every 10 Years) Tentatively due on 2024  COLONOSCOPY    2024  Surgical Procedure: COLONOSCOPY                     Hep C ( [id number]):  Negative 2023       Diet/Physical activity:  Very sedentary.  Poor diet.  Lack of variation.  Mostly processed foods.    Sexual health: Defers and declines.    PHQ-2 Depression Screening  Little interest or pleasure in doing things?  0   Feeling down, depressed, or hopeless?  0   PHQ-2 Total Score  0         Intimate partner violence: (Screen on initial visit, older adult with injury or evidence of neglect):  Violence can be a problem in many people's lives, so I now ask every patient about trauma or abuse they may have experienced in a relationship.  Stress/Safety - Do you feel safe in your relationship?  Afraid/Abused - Have you ever been in a relationship where you were threatened, hurt, or afraid?  Friend/Family - Are your friends aware you have been hurt?  Emergency Plan - Do you have a safe place to go and the resources you need in an emergency?    Osteoporosis:   Men: history of low trauma fracture, androgen deprivation therapy for prostate cancer, hypogonadism, primary hyperparathyroidism, intestinal disorders.     Objective     Physical Exam:  Vital Signs:   Vitals:    24 0903   BP: 128/90  "  BP Location: Left arm   Patient Position: Sitting   Cuff Size: Adult   Pulse: 86   Temp: 98.2 °F (36.8 °C)   TempSrc: Oral   SpO2: 95%   Weight: 110 kg (243 lb 6.4 oz)   Height: 180.3 cm (71\")   PainSc:   5   PainLoc: Neck     BMI is >= 30 and <35. (Class 1 Obesity). The following options were offered after discussion;: exercise counseling/recommendations and nutrition counseling/recommendations       Physical Exam  Vitals reviewed.   Constitutional:       General: He is not in acute distress.     Appearance: Normal appearance. He is not ill-appearing or toxic-appearing.   HENT:      Head: Normocephalic and atraumatic.      Nose: Nose normal.      Mouth/Throat:      Pharynx: Oropharynx is clear. Uvula midline. No pharyngeal swelling, oropharyngeal exudate, posterior oropharyngeal erythema, uvula swelling or postnasal drip.      Tonsils: No tonsillar exudate or tonsillar abscesses.      Comments: No pus or discharge noted.  Eyes:      Pupils: Pupils are equal, round, and reactive to light.   Neck:      Thyroid: No thyroid mass or thyromegaly.      Trachea: Trachea normal.     Cardiovascular:      Rate and Rhythm: Normal rate and regular rhythm.      Pulses: Normal pulses.      Heart sounds: Normal heart sounds.   Pulmonary:      Effort: Pulmonary effort is normal.      Breath sounds: Normal breath sounds.   Abdominal:      General: Abdomen is flat. Bowel sounds are normal.   Musculoskeletal:      Cervical back: Full passive range of motion without pain.   Lymphadenopathy:      Cervical:      Right cervical: No superficial cervical adenopathy.     Left cervical: No superficial cervical adenopathy.   Skin:     General: Skin is warm.   Neurological:      General: No focal deficit present.      Mental Status: He is alert and oriented to person, place, and time.   Psychiatric:         Mood and Affect: Mood normal.           Assessment / Plan      Assessment/Plan:   Diagnoses and all orders for this visit:    1. Annual " physical exam (Primary)  -     Comprehensive Metabolic Panel; Future  -     Lipid Panel; Future    2. Primary hypertension  Assessment & Plan:  Hypertension is borderline  Dietary sodium restriction.  Weight loss.  Regular aerobic exercise.  Stop smoking.  Ambulatory blood pressure monitoring.  Increase HCTZ to 1.5 mg. Monitor BP at home/local pharmacy for next 1 week. If BP running >130/80 persistently patient to notify me.   Blood pressure will be reassessedin 3 months.    Orders:  -     Comprehensive Metabolic Panel; Future  -     Lipid Panel; Future    3. Prediabetes  -     Ambulatory Referral to Nutrition Services    4. Sialadenitis  -     CT Soft Tissue Neck With Contrast; Future    5. Mass of right submandibular region  -     CT Soft Tissue Neck With Contrast; Future    6. Family history of substance dependence  -     naloxone (NARCAN) 4 MG/0.1ML nasal spray; Call 911. Don't prime. [place] in 1 nostril for overdose. Repeat in 2-3 minutes in other nostril if no or minimal breathing/responsiveness.  Dispense: 2 each; Refill: 2    Will do screening laboratory work for CMP and cholesterol panel.  Recently had A1c drawn 1 month ago  Along with blood counts which were within normal limits at last visit.  Referral sent to nutritionist for discussion of prediabetes and diet.  Recommend Mediterranean diet and ADA diet.  Recommend increasing to 250 minutes of moderate intensity exercise per week.  Important to control blood sugar to avoid worsening cardiovascular health in the future.    Would like to obtain new CT of soft tissue due to persistent swelling.  Possible chronic sial adenitis.  No signs of infection currently.  Discussed with patient that if he begins developing difficulty swallowing, fevers or chills, warmth, worsening swelling to the area, worsening pain would like him to either return to clinic or go to the ER. Patient was given phone number of ENT office he was referred to last month. Patient to rm  their office to schedule. Explained importance of f/u. Would like him to notify our office if he does not hear from them. Encourage sour candies and increasing hydration to increase secretions.     Will provide Narcan due to possible exposure to unknown substances at home with family history of substance use disorder.  Counseled patient on use of Narcan and how to use.  Advised if you suspect overdose and someone to call 911 immediately and use Narcan.    Follow Up:   Return if symptoms worsen or fail to improve.    Healthcare Maintenance:   Counseling provided on diet and exercise recommendations, recommended vaccinations, chronic condition management.   UTD on recommended preventative cancer screenings.   Declined vaccines for influenza and COVID19.     Bandar Sanchez voices understanding and acceptance of this advice and will call back with any further questions or concerns. AVS with preventive healthcare tips printed for patient.     Sonia Collado PA-C  [facility] [place] [address]

## 2024-12-18 RX ORDER — ATORVASTATIN CALCIUM 10 MG/1
10 TABLET, FILM COATED ORAL NIGHTLY
Qty: 30 TABLET | Refills: 11 | Status: SHIPPED | OUTPATIENT
Start: 2024-12-18

## 2025-01-14 ENCOUNTER — TELEPHONE (OUTPATIENT)
Dept: FAMILY MEDICINE CLINIC | Facility: CLINIC | Age: 50
End: 2025-01-14
Payer: COMMERCIAL

## 2025-01-14 DIAGNOSIS — R22.0 MASS OF RIGHT SUBMANDIBULAR REGION: Primary | ICD-10-CM

## 2025-01-14 DIAGNOSIS — K11.20 SIALADENITIS: ICD-10-CM

## 2025-01-14 NOTE — TELEPHONE ENCOUNTER
Insurance wants imaging for an US, there isn't an order for a US and no results for imaging. They are requesting A P2P.  [phone] P2P  Trackin    Please add to encounter if appointment for today needs to be canceled.

## 2025-01-16 ENCOUNTER — TELEPHONE (OUTPATIENT)
Dept: FAMILY MEDICINE CLINIC | Facility: CLINIC | Age: 50
End: 2025-01-16
Payer: COMMERCIAL

## 2025-01-16 ENCOUNTER — HOSPITAL ENCOUNTER (OUTPATIENT)
Facility: HOSPITAL | Age: 50
Discharge: HOME | End: 2025-01-16
Payer: COMMERCIAL

## 2025-01-16 DIAGNOSIS — R22.0 MASS OF RIGHT SUBMANDIBULAR REGION: ICD-10-CM

## 2025-01-16 DIAGNOSIS — K11.20 SIALADENITIS: ICD-10-CM

## 2025-01-16 PROCEDURE — 76536 US EXAM OF HEAD AND NECK: CPT

## 2025-01-16 NOTE — TELEPHONE ENCOUNTER
Spoke to patient.  He actually had appointment with ENT this afternoon.  He states that he Dr. Juanita De Oliveira and was actually able to remove it from his salivary gland today.  He states that he does feel little bit better.  He states he actually had the ultrasound after this procedure.  He states that they put him on amoxicillin.  He has follow-up with them next week.  Advised patient that I would like to see him after his appointment with the ENT just to follow-up.  Asked him to have them send their notes to our office.  He states he will have this done.  Discussed ER and return precautions with the patient.  He verbalized understanding and has no further questions at this time.

## 2025-01-29 ENCOUNTER — HOSPITAL ENCOUNTER (OUTPATIENT)
Dept: DIABETES SERVICES | Facility: HOSPITAL | Age: 50
Setting detail: RECURRING SERIES
Discharge: HOME | End: 2025-01-29

## 2025-01-29 NOTE — CONSULTS
Patient attended the scheduled 3 hour prediabetes education class. Please see media tab for assessment and notes if you use [employer]. If you are not an [employer] user a copy of patient's assessment and notes will be sent per routine. Thank you.

## 2025-02-24 ENCOUNTER — HOSPITAL ENCOUNTER (OUTPATIENT)
Dept: DIABETES SERVICES | Facility: HOSPITAL | Age: 50
Discharge: HOME | End: 2025-02-24
Payer: COMMERCIAL

## (undated) DEVICE — KT BEDSD ENZYM PRECLN 500ML

## (undated) DEVICE — TBG CO2 CLEVERCAP FOR OLYMPUS SCOPES

## (undated) DEVICE — SYR LUERLOK 50ML

## (undated) DEVICE — KT ORCA ORCAPOD DISP STRL

## (undated) DEVICE — SOLIDIFIER LIQ PREMISORB 1500CC

## (undated) DEVICE — CONTN GRAD MEAS TRIANG 32OZ BLK

## (undated) DEVICE — LUBE JELLY ENDO 1.4OZ

## (undated) DEVICE — INTRO ACCSR BLNT TP

## (undated) DEVICE — CANSTR SXN CRYSTALINE SEMI RIGID W/1ELBW 1000CC SURG

## (undated) DEVICE — ADAPT CLN LUM OLYMP AIR/H20

## (undated) DEVICE — SOL IRR H2O BTL 1000ML STRL

## (undated) DEVICE — TBG SXN CONN/F UNIV 1/4IN 10FT LF STRL

## (undated) DEVICE — TBG IRR ENDOGATOR 24HR UNIV REUS